# Patient Record
Sex: FEMALE | Race: WHITE | NOT HISPANIC OR LATINO | Employment: PART TIME | URBAN - NONMETROPOLITAN AREA
[De-identification: names, ages, dates, MRNs, and addresses within clinical notes are randomized per-mention and may not be internally consistent; named-entity substitution may affect disease eponyms.]

---

## 2017-01-24 ENCOUNTER — OFFICE VISIT (OUTPATIENT)
Dept: CARDIOLOGY | Facility: CLINIC | Age: 57
End: 2017-01-24

## 2017-01-24 VITALS
DIASTOLIC BLOOD PRESSURE: 83 MMHG | WEIGHT: 189.8 LBS | OXYGEN SATURATION: 98 % | HEART RATE: 86 BPM | BODY MASS INDEX: 35.83 KG/M2 | HEIGHT: 61 IN | SYSTOLIC BLOOD PRESSURE: 157 MMHG

## 2017-01-24 DIAGNOSIS — R00.2 PALPITATIONS: Primary | ICD-10-CM

## 2017-01-24 DIAGNOSIS — E78.5 HYPERLIPIDEMIA, UNSPECIFIED HYPERLIPIDEMIA TYPE: ICD-10-CM

## 2017-01-24 DIAGNOSIS — Z00.00 HEALTHCARE MAINTENANCE: ICD-10-CM

## 2017-01-24 PROCEDURE — 99213 OFFICE O/P EST LOW 20 MIN: CPT | Performed by: NURSE PRACTITIONER

## 2017-01-24 RX ORDER — LANSOPRAZOLE 30 MG/1
CAPSULE, DELAYED RELEASE ORAL DAILY
COMMUNITY
Start: 2016-12-30 | End: 2023-03-01

## 2017-01-24 RX ORDER — HYDROCODONE BITARTRATE AND ACETAMINOPHEN 5; 325 MG/1; MG/1
TABLET ORAL
Refills: 0 | COMMUNITY
Start: 2016-12-30 | End: 2021-01-19

## 2017-01-24 NOTE — PATIENT INSTRUCTIONS
Dyslipidemia  Dyslipidemia is an imbalance of the lipids in your blood. Lipids are waxy, fat-like proteins that your body needs in small amounts. Dyslipidemia often involves the lipids cholesterol or triglycerides. Common forms of dyslipidemia are:  · High levels of bad cholesterol (LDL cholesterol). LDL cholesterol is the type of cholesterol that causes heart disease.  · Low levels of good cholesterol (HDL cholesterol). HDL cholesterol is the type of cholesterol that helps protect against heart disease.  · High levels of triglycerides. Triglycerides are a fatty substance in the blood linked to a buildup of plaque on your arteries.  RISK FACTORS  · Increased age.  · Having a family history of high cholesterol.  · Certain medicines, including birth control pills, diuretics, beta-blockers, and some medicines for depression.  · Smoking.  · Eating a high-fat diet.  · Being overweight.  · Medical conditions such as diabetes, polycystic ovary syndrome, pregnancy, kidney disease, and hypothyroidism.  · Lack of regular exercise.  SIGNS AND SYMPTOMS  There are no signs or symptoms with dyslipidemia.  DIAGNOSIS  A simple blood test called a fasting blood test can be done to determine your level of:  · Total cholesterol. This is the combined number of LDL cholesterol and HDL cholesterol. A healthy number is lower than 200.  · LDL cholesterol. The goal number for LDL cholesterol is different for each person depending on risk factors. Ask your health care provider what your LDL cholesterol number should be.  · HDL cholesterol. A healthy level of HDL cholesterol is 60 or higher. A number lower than 40 for men or 50 for women is a danger sign.  · Triglycerides. A healthy triglyceride number is less than 150.  TREATMENT  Dyslipidemia is a treatable condition. Your health care provider will advise you on what type of treatment is best based on your age, your test results, and current guidelines. Treatment may include:  · Dietary  changes. A dietitian may help you create a diet that is based on your risk factors, conditions, and lifestyle.  · Regular exercise. This can help lower your LDL cholesterol, raise your HDL cholesterol, and help with weight management. Check with your health care provider before beginning an exercise program. Most people should participate in 30 minutes of brisk exercise 5 days a week.  · Quitting smoking.  · Medicines to lower LDL cholesterol and triglycerides.  · If you have high levels of triglycerides, your health care provider may:    Have you stop drinking alcohol.    Have you restrict your fat intake.    Have you eliminate refined sugars from your diet.    Treat you for other conditions, such as underactive thyroid gland (hypothyroidism) and high blood sugar (hyperglycemia).  Your health care provider will monitor your lipid levels with regular blood tests.  HOME CARE INSTRUCTIONS  · Eat a healthy diet. Follow any diet instructions if they were given to you by your health care provider.  · Maintain a healthy weight.  · Exercise regularly based on the recommendations of your health care provider.  · Do not use any tobacco products, including cigarettes, chewing tobacco, or electronic cigarettes.  · Take medicines only as directed by your health care provider.  · Keep all follow-up visits as directed by your health care provider.  SEEK MEDICAL CARE IF:  You are having possible side effects from your medicines.     This information is not intended to replace advice given to you by your health care provider. Make sure you discuss any questions you have with your health care provider.     Document Released: 12/23/2014 Document Revised: 01/08/2016 Document Reviewed: 12/23/2014  [employer] Interactive Patient Education ©2016 [employer]    Palpitations  A palpitation is the feeling that your heartbeat is irregular or is faster than normal. It may feel like your heart is fluttering or skipping a beat. Palpitations are  usually not a serious problem. However, in some cases, you may need further medical evaluation.  CAUSES   Palpitations can be caused by:  · Smoking.  · Caffeine or other stimulants, such as diet pills or energy drinks.  · Alcohol.  · Stress and anxiety.  · Strenuous physical activity.  · Fatigue.  · Certain medicines.  · Heart disease, especially if you have a history of irregular heart rhythms (arrhythmias), such as atrial fibrillation, atrial flutter, or supraventricular tachycardia.  · An improperly working pacemaker or defibrillator.  DIAGNOSIS   To find the cause of your palpitations, your health care provider will take your medical history and perform a physical exam. Your health care provider may also have you take a test called an ambulatory electrocardiogram (ECG). An ECG records your heartbeat patterns over a 24-hour period. You may also have other tests, such as:  · Transthoracic echocardiogram (TTE). During echocardiography, sound waves are used to evaluate how blood flows through your heart.  · Transesophageal echocardiogram (EUGENIE).  · Cardiac monitoring. This allows your health care provider to monitor your heart rate and rhythm in real time.  · Holter monitor. This is a portable device that records your heartbeat and can help diagnose heart arrhythmias. It allows your health care provider to track your heart activity for several days, if needed.  · Stress tests by exercise or by giving medicine that makes the heart beat faster.  TREATMENT   Treatment of palpitations depends on the cause of your symptoms and can vary greatly. Most cases of palpitations do not require any treatment other than time, relaxation, and monitoring your symptoms. Other causes, such as atrial fibrillation, atrial flutter, or supraventricular tachycardia, usually require further treatment.  HOME CARE INSTRUCTIONS   · Avoid:    Caffeinated coffee, tea, soft drinks, diet pills, and energy drinks.    Chocolate.    Alcohol.  · Stop  smoking if you smoke.  · Reduce your stress and anxiety. Things that can help you relax include:    A method of controlling things in your body, such as your heartbeats, with your mind (biofeedback).    Yoga.    Meditation.    Physical activity such as swimming, jogging, or walking.  · Get plenty of rest and sleep.  SEEK MEDICAL CARE IF:   · You continue to have a fast or irregular heartbeat beyond 24 hours.  · Your palpitations occur more often.  SEEK IMMEDIATE MEDICAL CARE IF:  · You have chest pain or shortness of breath.  · You have a severe headache.  · You feel dizzy or you faint.  MAKE SURE YOU:  · Understand these instructions.  · Will watch your condition.  · Will get help right away if you are not doing well or get worse.     This information is not intended to replace advice given to you by your health care provider. Make sure you discuss any questions you have with your health care provider.     Document Released: 12/15/2001 Document Revised: 12/23/2014 Document Reviewed: 02/15/2013  [employer] Interactive Patient Education ©2016 [employer]  Edema  Edema is an abnormal buildup of fluids in your body tissues. Edema is somewhat dependent on gravity to pull the fluid to the lowest place in your body. That makes the condition more common in the legs and thighs (lower extremities). Painless swelling of the feet and ankles is common and becomes more likely as you get older. It is also common in looser tissues, like around your eyes.   When the affected area is squeezed, the fluid may move out of that spot and leave a dent for a few moments. This dent is called pitting.   CAUSES   There are many possible causes of edema. Eating too much salt and being on your feet or sitting for a long time can cause edema in your legs and ankles. Hot weather may make edema worse. Common medical causes of edema include:  · Heart failure.  · Liver disease.  · Kidney disease.  · Weak blood vessels in your legs.  · Cancer.  · An  injury.  · Pregnancy.  · Some medications.  · Obesity.   SYMPTOMS   Edema is usually painless. Your skin may look swollen or shiny.   DIAGNOSIS   Your health care provider may be able to diagnose edema by asking about your medical history and doing a physical exam. You may need to have tests such as X-rays, an electrocardiogram, or blood tests to check for medical conditions that may cause edema.   TREATMENT   Edema treatment depends on the cause. If you have heart, liver, or kidney disease, you need the treatment appropriate for these conditions. General treatment may include:  · Elevation of the affected body part above the level of your heart.  · Compression of the affected body part. Pressure from elastic bandages or support stockings squeezes the tissues and forces fluid back into the blood vessels. This keeps fluid from entering the tissues.  · Restriction of fluid and salt intake.  · Use of a water pill (diuretic). These medications are appropriate only for some types of edema. They pull fluid out of your body and make you urinate more often. This gets rid of fluid and reduces swelling, but diuretics can have side effects. Only use diuretics as directed by your health care provider.  HOME CARE INSTRUCTIONS   · Keep the affected body part above the level of your heart when you are lying down.    · Do not sit still or stand for prolonged periods.    · Do not put anything directly under your knees when lying down.  · Do not wear constricting clothing or garters on your upper legs.    · Exercise your legs to work the fluid back into your blood vessels. This may help the swelling go down.    · Wear elastic bandages or support stockings to reduce ankle swelling as directed by your health care provider.    · Eat a low-salt diet to reduce fluid if your health care provider recommends it.    · Only take medicines as directed by your health care provider.   SEEK MEDICAL CARE IF:   · Your edema is not responding to  treatment.  · You have heart, liver, or kidney disease and notice symptoms of edema.  · You have edema in your legs that does not improve after elevating them.    · You have sudden and unexplained weight gain.  SEEK IMMEDIATE MEDICAL CARE IF:   · You develop shortness of breath or chest pain.    · You cannot breathe when you lie down.  · You develop pain, redness, or warmth in the swollen areas.    · You have heart, liver, or kidney disease and suddenly get edema.  · You have a fever and your symptoms suddenly get worse.  MAKE SURE YOU:   · Understand these instructions.  · Will watch your condition.  · Will get help right away if you are not doing well or get worse.     This information is not intended to replace advice given to you by your health care provider. Make sure you discuss any questions you have with your health care provider.     Document Released: 12/18/2006 Document Revised: 01/08/2016 Document Reviewed: 10/10/2014  [employer] Interactive Patient Education ©2016 [employer]

## 2017-01-24 NOTE — MR AVS SNAPSHOT
Xochitl Albert   1/24/2017 3:30 PM   Office Visit    Dept Phone:  [phone]   Encounter #:  [id number]    Provider:  KENNA Gaspar   Department:  [facility] CARDIOLOGY                Your Full Care Plan              Your Updated Medication List          This list is accurate as of: 1/24/17  4:40 PM.  Always use your most recent med list.                HYDROcodone-acetaminophen 5-325 MG per tablet   Commonly known as:  NORCO       lansoprazole 30 MG capsule   Commonly known as:  PREVACID               You Were Diagnosed With        Codes Comments    Palpitations    -  Primary ICD-10-CM: R00.2  ICD-9-CM: 785.1     Hyperlipidemia, unspecified hyperlipidemia type     ICD-10-CM: E78.5  ICD-9-CM: 272.4     Healthcare maintenance     ICD-10-CM: Z00.00  ICD-9-CM: V70.0       Instructions    Dyslipidemia  Dyslipidemia is an imbalance of the lipids in your blood. Lipids are waxy, fat-like proteins that your body needs in small amounts. Dyslipidemia often involves the lipids cholesterol or triglycerides. Common forms of dyslipidemia are:  · High levels of bad cholesterol (LDL cholesterol). LDL cholesterol is the type of cholesterol that causes heart disease.  · Low levels of good cholesterol (HDL cholesterol). HDL cholesterol is the type of cholesterol that helps protect against heart disease.  · High levels of triglycerides. Triglycerides are a fatty substance in the blood linked to a buildup of plaque on your arteries.  RISK FACTORS  · Increased age.  · Having a family history of high cholesterol.  · Certain medicines, including birth control pills, diuretics, beta-blockers, and some medicines for depression.  · Smoking.  · Eating a high-fat diet.  · Being overweight.  · Medical conditions such as diabetes, polycystic ovary syndrome, pregnancy, kidney disease, and hypothyroidism.  · Lack of regular exercise.  SIGNS AND SYMPTOMS  There are no signs or symptoms with  dyslipidemia.  DIAGNOSIS  A simple blood test called a fasting blood test can be done to determine your level of:  · Total cholesterol. This is the combined number of LDL cholesterol and HDL cholesterol. A healthy number is lower than 200.  · LDL cholesterol. The goal number for LDL cholesterol is different for each person depending on risk factors. Ask your health care provider what your LDL cholesterol number should be.  · HDL cholesterol. A healthy level of HDL cholesterol is 60 or higher. A number lower than 40 for men or 50 for women is a danger sign.  · Triglycerides. A healthy triglyceride number is less than 150.  TREATMENT  Dyslipidemia is a treatable condition. Your health care provider will advise you on what type of treatment is best based on your age, your test results, and current guidelines. Treatment may include:  · Dietary changes. A dietitian may help you create a diet that is based on your risk factors, conditions, and lifestyle.  · Regular exercise. This can help lower your LDL cholesterol, raise your HDL cholesterol, and help with weight management. Check with your health care provider before beginning an exercise program. Most people should participate in 30 minutes of brisk exercise 5 days a week.  · Quitting smoking.  · Medicines to lower LDL cholesterol and triglycerides.  · If you have high levels of triglycerides, your health care provider may:    Have you stop drinking alcohol.    Have you restrict your fat intake.    Have you eliminate refined sugars from your diet.    Treat you for other conditions, such as underactive thyroid gland (hypothyroidism) and high blood sugar (hyperglycemia).  Your health care provider will monitor your lipid levels with regular blood tests.  HOME CARE INSTRUCTIONS  · Eat a healthy diet. Follow any diet instructions if they were given to you by your health care provider.  · Maintain a healthy weight.  · Exercise regularly based on the recommendations of your  health care provider.  · Do not use any tobacco products, including cigarettes, chewing tobacco, or electronic cigarettes.  · Take medicines only as directed by your health care provider.  · Keep all follow-up visits as directed by your health care provider.  SEEK MEDICAL CARE IF:  You are having possible side effects from your medicines.     This information is not intended to replace advice given to you by your health care provider. Make sure you discuss any questions you have with your health care provider.     Document Released: 12/23/2014 Document Revised: 01/08/2016 Document Reviewed: 12/23/2014  [employer] Interactive Patient Education ©2016 [employer]    Palpitations  A palpitation is the feeling that your heartbeat is irregular or is faster than normal. It may feel like your heart is fluttering or skipping a beat. Palpitations are usually not a serious problem. However, in some cases, you may need further medical evaluation.  CAUSES   Palpitations can be caused by:  · Smoking.  · Caffeine or other stimulants, such as diet pills or energy drinks.  · Alcohol.  · Stress and anxiety.  · Strenuous physical activity.  · Fatigue.  · Certain medicines.  · Heart disease, especially if you have a history of irregular heart rhythms (arrhythmias), such as atrial fibrillation, atrial flutter, or supraventricular tachycardia.  · An improperly working pacemaker or defibrillator.  DIAGNOSIS   To find the cause of your palpitations, your health care provider will take your medical history and perform a physical exam. Your health care provider may also have you take a test called an ambulatory electrocardiogram (ECG). An ECG records your heartbeat patterns over a 24-hour period. You may also have other tests, such as:  · Transthoracic echocardiogram (TTE). During echocardiography, sound waves are used to evaluate how blood flows through your heart.  · Transesophageal echocardiogram (EUGENIE).  · Cardiac monitoring. This allows  your health care provider to monitor your heart rate and rhythm in real time.  · Holter monitor. This is a portable device that records your heartbeat and can help diagnose heart arrhythmias. It allows your health care provider to track your heart activity for several days, if needed.  · Stress tests by exercise or by giving medicine that makes the heart beat faster.  TREATMENT   Treatment of palpitations depends on the cause of your symptoms and can vary greatly. Most cases of palpitations do not require any treatment other than time, relaxation, and monitoring your symptoms. Other causes, such as atrial fibrillation, atrial flutter, or supraventricular tachycardia, usually require further treatment.  HOME CARE INSTRUCTIONS   · Avoid:    Caffeinated coffee, tea, soft drinks, diet pills, and energy drinks.    Chocolate.    Alcohol.  · Stop smoking if you smoke.  · Reduce your stress and anxiety. Things that can help you relax include:    A method of controlling things in your body, such as your heartbeats, with your mind (biofeedback).    Yoga.    Meditation.    Physical activity such as swimming, jogging, or walking.  · Get plenty of rest and sleep.  SEEK MEDICAL CARE IF:   · You continue to have a fast or irregular heartbeat beyond 24 hours.  · Your palpitations occur more often.  SEEK IMMEDIATE MEDICAL CARE IF:  · You have chest pain or shortness of breath.  · You have a severe headache.  · You feel dizzy or you faint.  MAKE SURE YOU:  · Understand these instructions.  · Will watch your condition.  · Will get help right away if you are not doing well or get worse.     This information is not intended to replace advice given to you by your health care provider. Make sure you discuss any questions you have with your health care provider.     Document Released: 12/15/2001 Document Revised: 12/23/2014 Document Reviewed: 02/15/2013  [employer] Interactive Patient Education ©2016 [employer] Inc.  Edema  Edema is an  abnormal buildup of fluids in your body tissues. Edema is somewhat dependent on gravity to pull the fluid to the lowest place in your body. That makes the condition more common in the legs and thighs (lower extremities). Painless swelling of the feet and ankles is common and becomes more likely as you get older. It is also common in looser tissues, like around your eyes.   When the affected area is squeezed, the fluid may move out of that spot and leave a dent for a few moments. This dent is called pitting.   CAUSES   There are many possible causes of edema. Eating too much salt and being on your feet or sitting for a long time can cause edema in your legs and ankles. Hot weather may make edema worse. Common medical causes of edema include:  · Heart failure.  · Liver disease.  · Kidney disease.  · Weak blood vessels in your legs.  · Cancer.  · An injury.  · Pregnancy.  · Some medications.  · Obesity.   SYMPTOMS   Edema is usually painless. Your skin may look swollen or shiny.   DIAGNOSIS   Your health care provider may be able to diagnose edema by asking about your medical history and doing a physical exam. You may need to have tests such as X-rays, an electrocardiogram, or blood tests to check for medical conditions that may cause edema.   TREATMENT   Edema treatment depends on the cause. If you have heart, liver, or kidney disease, you need the treatment appropriate for these conditions. General treatment may include:  · Elevation of the affected body part above the level of your heart.  · Compression of the affected body part. Pressure from elastic bandages or support stockings squeezes the tissues and forces fluid back into the blood vessels. This keeps fluid from entering the tissues.  · Restriction of fluid and salt intake.  · Use of a water pill (diuretic). These medications are appropriate only for some types of edema. They pull fluid out of your body and make you urinate more often. This gets rid of fluid  and reduces swelling, but diuretics can have side effects. Only use diuretics as directed by your health care provider.  HOME CARE INSTRUCTIONS   · Keep the affected body part above the level of your heart when you are lying down.    · Do not sit still or stand for prolonged periods.    · Do not put anything directly under your knees when lying down.  · Do not wear constricting clothing or garters on your upper legs.    · Exercise your legs to work the fluid back into your blood vessels. This may help the swelling go down.    · Wear elastic bandages or support stockings to reduce ankle swelling as directed by your health care provider.    · Eat a low-salt diet to reduce fluid if your health care provider recommends it.    · Only take medicines as directed by your health care provider.   SEEK MEDICAL CARE IF:   · Your edema is not responding to treatment.  · You have heart, liver, or kidney disease and notice symptoms of edema.  · You have edema in your legs that does not improve after elevating them.    · You have sudden and unexplained weight gain.  SEEK IMMEDIATE MEDICAL CARE IF:   · You develop shortness of breath or chest pain.    · You cannot breathe when you lie down.  · You develop pain, redness, or warmth in the swollen areas.    · You have heart, liver, or kidney disease and suddenly get edema.  · You have a fever and your symptoms suddenly get worse.  MAKE SURE YOU:   · Understand these instructions.  · Will watch your condition.  · Will get help right away if you are not doing well or get worse.     This information is not intended to replace advice given to you by your health care provider. Make sure you discuss any questions you have with your health care provider.     Document Released: 12/18/2006 Document Revised: 01/08/2016 Document Reviewed: 10/10/2014  [employer] Interactive Patient Education ©2016 [employer]       Patient Instructions History      Upcoming Appointments     Visit Type Date Time  "Department    OFFICE VISIT 2017  3:30 PM [facility]      [facility] Signup     [facility] [employer] allows you to send messages to your doctor, view your test results, renew your prescriptions, schedule appointments, and more. To sign up, go to [email or web address] and click on the Sign Up Now link in the New User? box. Enter your [employer] Activation Code exactly as it appears below along with the last four digits of your Social Security Number and your Date of Birth () to complete the sign-up process. If you do not sign up before the expiration date, you must request a new code.    [employer] Activation Code: [email or web address]  Expires: 2017  4:39 PM    If you have questions, you can email [email or web address] or call [phone] to talk to our [employer] staff. Remember, [employer] is NOT to be used for urgent needs. For medical emergencies, dial 911.               Other Info from Your Visit           Allergies     Bactrim [Sulfamethoxazole-trimethoprim]      Sulfa Antibiotics        Reason for Visit     Follow-up 6 mo.      Vital Signs     Blood Pressure Pulse Height Weight Oxygen Saturation Body Mass Index    157/83 (BP Location: Left arm, Patient Position: Sitting) 86 61\" (154.9 cm) 189 lb 12.8 oz (86.1 kg) 98% 35.86 kg/m2    Smoking Status                   Never Smoker           Problems and Diagnoses Noted     High cholesterol or triglycerides    Palpitations    Routine medical exam            "

## 2017-01-24 NOTE — PROGRESS NOTES
Subjective   Xochitl Albert is a 56 y.o. female     Chief Complaint   Patient presents with   • Follow-up     6 mo.       HPI    Problem List:    1.) Palpitations  2.) Hyperlipidemia  3.) Reported history of Leaky Heart Valve, insuff. data  3.1.) Echo 7/30/15 - EF > 65%; DD I; Trace MR, TR    Patient is a 56-year-old female who presents today for a follow-up with her son at her side.  She denies any chest pain or pressure.  She says she will have palpitations, but not very often at all.  She denies any dizziness, presyncope, syncope, orthopnea or PND.  She will get edema usually at end of the day.  She works at the [employer] and is on her feet all day.  She denies any shortness of breath with her daily routine.  PCP has not checked her cholesterol in some time.     Current Outpatient Prescriptions   Medication Sig Dispense Refill   • HYDROcodone-acetaminophen (NORCO) 5-325 MG per tablet prn  0   • lansoprazole (PREVACID) 30 MG capsule Daily.       No current facility-administered medications for this visit.        ALLERGIES    Bactrim [sulfamethoxazole-trimethoprim] and Sulfa antibiotics    Past Medical History   Diagnosis Date   • Arrhythmia    • Edema    • Fatigue    • Hiatal hernia    • Hyperlipidemia    • Leaky heart valve    • Palpitations        Social History     Social History   • Marital status:      Spouse name: N/A   • Number of children: N/A   • Years of education: N/A     Occupational History   • Not on file.     Social History Main Topics   • Smoking status: Never Smoker   • Smokeless tobacco: Not on file   • Alcohol use No   • Drug use: No   • Sexual activity: Not on file     Other Topics Concern   • Not on file     Social History Narrative       Family History   Problem Relation Age of Onset   • Hypertension Mother    • Hyperlipidemia Father        Review of Systems   Constitutional: Negative for diaphoresis and fatigue.   HENT: Positive for sneezing. Negative for rhinorrhea.    Eyes:  "Positive for visual disturbance (reading glasses; little difference, has to use her glasses a little more).   Respiratory: Negative for chest tightness and shortness of breath.    Cardiovascular: Positive for palpitations (usually if drink a lot of caffeine ) and leg swelling (usual ). Negative for chest pain.   Gastrointestinal: Negative for constipation, diarrhea, nausea and vomiting.   Endocrine: Negative.    Genitourinary: Negative for difficulty urinating.   Musculoskeletal: Positive for arthralgias, myalgias and neck pain (MRI bone spurs, arthritis and bursitis ). Negative for back pain.        Right shoulder, cyst and torn area    Skin: Negative.    Allergic/Immunologic: Positive for environmental allergies.   Neurological: Positive for light-headedness (sometimes ). Negative for dizziness and syncope.   Hematological: Negative.    Psychiatric/Behavioral: Negative.        Objective   Visit Vitals   • /83 (BP Location: Left arm, Patient Position: Sitting)   • Pulse 86   • Ht 61\" (154.9 cm)   • Wt 189 lb 12.8 oz (86.1 kg)   • SpO2 98%   • BMI 35.86 kg/m2     Lab Results (most recent)     None        Physical Exam   Constitutional: She is oriented to person, place, and time. Vital signs are normal. She appears well-developed and well-nourished. She is active and cooperative.   HENT:   Head: Normocephalic.   Mouth/Throat: Abnormal dentition (missing teeth ).   Eyes: Lids are normal.   Wears glasses PRN    Neck: Normal carotid pulses, no hepatojugular reflux and no JVD present. Carotid bruit is not present.   Cardiovascular: Normal rate, regular rhythm and normal heart sounds.    Pulses:       Radial pulses are 2+ on the right side, and 2+ on the left side.        Dorsalis pedis pulses are 2+ on the right side, and 2+ on the left side.        Posterior tibial pulses are 2+ on the right side   1+ edema LLE; trace edema RLE.    Pulmonary/Chest: Effort normal and breath sounds normal.   Abdominal: Normal " appearance.   Neurological: She is alert and oriented to person, place, and time.   Skin: Skin is warm, dry and intact.   Psychiatric: She has a normal mood and affect. Her speech is normal and behavior is normal. Judgment and thought content normal. Cognition and memory are normal.         Assessment/Plan      Diagnosis Plan   1. Palpitations     2. Hyperlipidemia, unspecified hyperlipidemia type  Lipid Panel    Comprehensive Metabolic Panel    Lipid Panel    Comprehensive Metabolic Panel   3. Healthcare maintenance  Lipid Panel    Comprehensive Metabolic Panel    Lipid Panel    Comprehensive Metabolic Panel       Return in about 6 months (around 7/24/2017).       Patient is doing very well from a cardiac standpoint.  She will continue her medication regimen.  She will get lipids and CMP.  She will follow-up in 6 mos or sooner if any changes.

## 2021-01-19 ENCOUNTER — OFFICE VISIT (OUTPATIENT)
Dept: CARDIOLOGY | Facility: CLINIC | Age: 61
End: 2021-01-19

## 2021-01-19 VITALS
DIASTOLIC BLOOD PRESSURE: 98 MMHG | BODY MASS INDEX: 34.55 KG/M2 | SYSTOLIC BLOOD PRESSURE: 166 MMHG | HEART RATE: 102 BPM | OXYGEN SATURATION: 98 % | WEIGHT: 183 LBS | HEIGHT: 61 IN

## 2021-01-19 DIAGNOSIS — R00.2 PALPITATIONS: Primary | ICD-10-CM

## 2021-01-19 DIAGNOSIS — I10 ESSENTIAL HYPERTENSION: ICD-10-CM

## 2021-01-19 DIAGNOSIS — R00.0 TACHYCARDIA: ICD-10-CM

## 2021-01-19 DIAGNOSIS — E78.2 MIXED HYPERLIPIDEMIA: ICD-10-CM

## 2021-01-19 PROCEDURE — 99204 OFFICE O/P NEW MOD 45 MIN: CPT | Performed by: NURSE PRACTITIONER

## 2021-01-19 NOTE — PATIENT INSTRUCTIONS
"Fat and Cholesterol Restricted Eating Plan  Getting too much fat and cholesterol in your diet may cause health problems. Choosing the right foods helps keep your fat and cholesterol at normal levels. This can keep you from getting certain diseases.  Your doctor may recommend an eating plan that includes:  · Total fat: ______% or less of total calories a day.  · Saturated fat: ______% or less of total calories a day.  · Cholesterol: less than _________mg a day.  · Fiber: ______g a day.  What are tips for following this plan?  Meal planning  · At meals, divide your plate into four equal parts:  ? Fill one-half of your plate with vegetables and green salads.  ? Fill one-fourth of your plate with whole grains.  ? Fill one-fourth of your plate with low-fat (lean) protein foods.  · Eat fish that is high in omega-3 fats at least two times a week. This includes mackerel, tuna, sardines, and salmon.  · Eat foods that are high in fiber, such as whole grains, beans, apples, broccoli, carrots, peas, and barley.  General tips    · Work with your doctor to lose weight if you need to.  · Avoid:  ? Foods with added sugar.  ? Fried foods.  ? Foods with partially hydrogenated oils.  · Limit alcohol intake to no more than 1 drink a day for nonpregnant women and 2 drinks a day for men. One drink equals 12 oz of beer, 5 oz of wine, or 1½ oz of hard liquor.  Reading food labels  · Check food labels for:  ? Trans fats.  ? Partially hydrogenated oils.  ? Saturated fat (g) in each serving.  ? Cholesterol (mg) in each serving.  ? Fiber (g) in each serving.  · Choose foods with healthy fats, such as:  ? Monounsaturated fats.  ? Polyunsaturated fats.  ? Omega-3 fats.  · Choose grain products that have whole grains. Look for the word \"whole\" as the first word in the ingredient list.  Cooking  · Cook foods using low-fat methods. These include baking, boiling, grilling, and broiling.  · Eat more home-cooked foods. Eat at restaurants and buffets " less often.  · Avoid cooking using saturated fats, such as butter, cream, palm oil, palm kernel oil, and coconut oil.  Recommended foods    Fruits  · All fresh, canned (in natural juice), or frozen fruits.  Vegetables  · Fresh or frozen vegetables (raw, steamed, roasted, or grilled). Green salads.  Grains  · Whole grains, such as whole wheat or whole grain breads, crackers, cereals, and pasta. Unsweetened oatmeal, bulgur, barley, quinoa, or brown rice. Corn or whole wheat flour tortillas.  Meats and other protein foods  · Ground beef (85% or leaner), grass-fed beef, or beef trimmed of fat. Skinless chicken or turkey. Ground chicken or turkey. Pork trimmed of fat. All fish and seafood. Egg whites. Dried beans, peas, or lentils. Unsalted nuts or seeds. Unsalted canned beans. Nut butters without added sugar or oil.  Dairy  · Low-fat or nonfat dairy products, such as skim or 1% milk, 2% or reduced-fat cheeses, low-fat and fat-free ricotta or cottage cheese, or plain low-fat and nonfat yogurt.  Fats and oils  · Tub margarine without trans fats. Light or reduced-fat mayonnaise and salad dressings. Avocado. Olive, canola, sesame, or safflower oils.  The items listed above may not be a complete list of foods and beverages you can eat. Contact a dietitian for more information.  Foods to avoid  Fruits  · Canned fruit in heavy syrup. Fruit in cream or butter sauce. Fried fruit.  Vegetables  · Vegetables cooked in cheese, cream, or butter sauce. Fried vegetables.  Grains  · White bread. White pasta. White rice. Cornbread. Bagels, pastries, and croissants. Crackers and snack foods that contain trans fat and hydrogenated oils.  Meats and other protein foods  · Fatty cuts of meat. Ribs, chicken wings, pavon, sausage, bologna, salami, chitterlings, fatback, hot dogs, bratwurst, and packaged lunch meats. Liver and organ meats. Whole eggs and egg yolks. Chicken and turkey with skin. Fried meat.  Dairy  · Whole or 2% milk, cream,  half-and-half, and cream cheese. Whole milk cheeses. Whole-fat or sweetened yogurt. Full-fat cheeses. Nondairy creamers and whipped toppings. Processed cheese, cheese spreads, and cheese curds.  Beverages  · Alcohol. Sugar-sweetened drinks such as sodas, lemonade, and fruit drinks.  Fats and oils  · Butter, stick margarine, lard, shortening, ghee, or pavon fat. Coconut, palm kernel, and palm oils.  Sweets and desserts  · Corn syrup, sugars, honey, and molasses. Candy. Jam and jelly. Syrup. Sweetened cereals. Cookies, pies, cakes, donuts, muffins, and ice cream.  The items listed above may not be a complete list of foods and beverages you should avoid. Contact a dietitian for more information.  Summary  · Choosing the right foods helps keep your fat and cholesterol at normal levels. This can keep you from getting certain diseases.  · At meals, fill one-half of your plate with vegetables and green salads.  · Eat high-fiber foods, like whole grains, beans, apples, carrots, peas, and barley.  · Limit added sugar, saturated fats, alcohol, and fried foods.  This information is not intended to replace advice given to you by your health care provider. Make sure you discuss any questions you have with your health care provider.  Document Revised: 08/21/2019 Document Reviewed: 09/04/2018  [employer] Patient Education © 2020 [employer]  BMI for Adults  What is BMI?  Body mass index (BMI) is a number that is calculated from a person's weight and height. BMI can help estimate how much of a person's weight is composed of fat. BMI does not measure body fat directly. Rather, it is an alternative to procedures that directly measure body fat, which can be difficult and expensive.  BMI can help identify people who may be at higher risk for certain medical problems.  What are BMI measurements used for?  BMI is used as a screening tool to identify possible weight problems. It helps determine whether a person is obese, overweight, a  "healthy weight, or underweight.  BMI is useful for:  · Identifying a weight problem that may be related to a medical condition or may increase the risk for medical problems.  · Promoting changes, such as changes in diet and exercise, to help reach a healthy weight. BMI screening can be repeated to see if these changes are working.  How is BMI calculated?  BMI involves measuring your weight in relation to your height. Both height and weight are measured, and the BMI is calculated from those numbers. This can be done either in English ([place] or metric measurements. Note that charts and online BMI calculators are available to help you find your BMI quickly and easily without having to do these calculations yourself.  To calculate your BMI in English ([place] measurements:    1. Measure your weight in pounds (lb).  2. Multiply the number of pounds by 703.  ? For example, for a person who weighs 180 lb, multiply that number by 703, which equals 126,540.  3. Measure your height in inches. Then multiply that number by itself to get a measurement called \"inches squared.\"  ? For example, for a person who is 70 inches tall, the \"inches squared\" measurement is 70 inches x 70 inches, which equals 4,900 inches squared.  4. Divide the total from step 2 (number of lb x 703) by the total from step 3 (inches squared): 126,540 ÷ 4,900 = 25.8. This is your BMI.  To calculate your BMI in metric measurements:  1. Measure your weight in kilograms (kg).  2. Measure your height in meters (m). Then multiply that number by itself to get a measurement called \"meters squared.\"  ? For example, for a person who is 1.75 m tall, the \"meters squared\" measurement is 1.75 m x 1.75 m, which is equal to 3.1 meters squared.  3. Divide the number of kilograms (your weight) by the meters squared number. In this example: 70 ÷ 3.1 = 22.6. This is your BMI.  What do the results mean?  BMI charts are used to identify whether you are underweight, normal weight, " overweight, or obese. The following guidelines will be used:  · Underweight: BMI less than 18.5.  · Normal weight: BMI between 18.5 and 24.9.  · Overweight: BMI between 25 and 29.9.  · Obese: BMI of 30 or above.  Keep these notes in mind:  · Weight includes both fat and muscle, so someone with a muscular build, such as an athlete, may have a BMI that is higher than 24.9. In cases like these, BMI is not an accurate measure of body fat.  · To determine if excess body fat is the cause of a BMI of 25 or higher, further assessments may need to be done by a health care provider.  · BMI is usually interpreted in the same way for men and women.  Where to find more information  For more information about BMI, including tools to quickly calculate your BMI, go to these websites:  · Centers for Disease Control and Prevention: www.cdc.gov  · American Heart Association: www.heart.org  · National Heart, Lung, and Blood [place]: www.nhlbi.nih.gov  Summary  · Body mass index (BMI) is a number that is calculated from a person's weight and height.  · BMI may help estimate how much of a person's weight is composed of fat. BMI can help identify those who may be at higher risk for certain medical problems.  · BMI can be measured using English measurements or metric measurements.  · BMI charts are used to identify whether you are underweight, normal weight, overweight, or obese.  This information is not intended to replace advice given to you by your health care provider. Make sure you discuss any questions you have with your health care provider.  Document Revised: 09/09/2020 Document Reviewed: 07/17/2020  [employer] Patient Education © 2020 [employer]    Hypertension, Adult  Hypertension is another name for high blood pressure. High blood pressure forces your heart to work harder to pump blood. This can cause problems over time.  There are two numbers in a blood pressure reading. There is a top number (systolic) over a bottom number  (diastolic). It is best to have a blood pressure that is below 120/80. Healthy choices can help lower your blood pressure, or you may need medicine to help lower it.  What are the causes?  The cause of this condition is not known. Some conditions may be related to high blood pressure.  What increases the risk?  · Smoking.  · Having type 2 diabetes mellitus, high cholesterol, or both.  · Not getting enough exercise or physical activity.  · Being overweight.  · Having too much fat, sugar, calories, or salt (sodium) in your diet.  · Drinking too much alcohol.  · Having long-term (chronic) kidney disease.  · Having a family history of high blood pressure.  · Age. Risk increases with age.  · Race. You may be at higher risk if you are .  · Gender. Men are at higher risk than women before age 45. After age 65, women are at higher risk than men.  · Having obstructive sleep apnea.  · Stress.  What are the signs or symptoms?  · High blood pressure may not cause symptoms. Very high blood pressure (hypertensive crisis) may cause:  ? Headache.  ? Feelings of worry or nervousness (anxiety).  ? Shortness of breath.  ? Nosebleed.  ? A feeling of being sick to your stomach (nausea).  ? Throwing up (vomiting).  ? Changes in how you see.  ? Very bad chest pain.  ? Seizures.  How is this treated?  · This condition is treated by making healthy lifestyle changes, such as:  ? Eating healthy foods.  ? Exercising more.  ? Drinking less alcohol.  · Your health care provider may prescribe medicine if lifestyle changes are not enough to get your blood pressure under control, and if:  ? Your top number is above 130.  ? Your bottom number is above 80.  · Your personal target blood pressure may vary.  Follow these instructions at home:  Eating and drinking    · If told, follow the DASH eating plan. To follow this plan:  ? Fill one half of your plate at each meal with fruits and vegetables.  ? Fill one fourth of your plate at each  meal with whole grains. Whole grains include whole-wheat pasta, brown rice, and whole-grain bread.  ? Eat or drink low-fat dairy products, such as skim milk or low-fat yogurt.  ? Fill one fourth of your plate at each meal with low-fat (lean) proteins. Low-fat proteins include fish, chicken without skin, eggs, beans, and tofu.  ? Avoid fatty meat, cured and processed meat, or chicken with skin.  ? Avoid pre-made or processed food.  · Eat less than 1,500 mg of salt each day.  · Do not drink alcohol if:  ? Your doctor tells you not to drink.  ? You are pregnant, may be pregnant, or are planning to become pregnant.  · If you drink alcohol:  ? Limit how much you use to:  § 0-1 drink a day for women.  § 0-2 drinks a day for men.  ? Be aware of how much alcohol is in your drink. In the [place] one drink equals one 12 oz bottle of beer (355 mL), one 5 oz glass of wine (148 mL), or one 1½ oz glass of hard liquor (44 mL).  Lifestyle    · Work with your doctor to stay at a healthy weight or to lose weight. Ask your doctor what the best weight is for you.  · Get at least 30 minutes of exercise most days of the week. This may include walking, swimming, or biking.  · Get at least 30 minutes of exercise that strengthens your muscles (resistance exercise) at least 3 days a week. This may include lifting weights or doing Pilates.  · Do not use any products that contain nicotine or tobacco, such as cigarettes, e-cigarettes, and chewing tobacco. If you need help quitting, ask your doctor.  · Check your blood pressure at home as told by your doctor.  · Keep all follow-up visits as told by your doctor. This is important.  Medicines  · Take over-the-counter and prescription medicines only as told by your doctor. Follow directions carefully.  · Do not skip doses of blood pressure medicine. The medicine does not work as well if you skip doses. Skipping doses also puts you at risk for problems.  · Ask your doctor about side effects or  reactions to medicines that you should watch for.  Contact a doctor if you:  · Think you are having a reaction to the medicine you are taking.  · Have headaches that keep coming back (recurring).  · Feel dizzy.  · Have swelling in your ankles.  · Have trouble with your vision.  Get help right away if you:  · Get a very bad headache.  · Start to feel mixed up (confused).  · Feel weak or numb.  · Feel faint.  · Have very bad pain in your:  ? Chest.  ? Belly (abdomen).  · Throw up more than once.  · Have trouble breathing.  Summary  · Hypertension is another name for high blood pressure.  · High blood pressure forces your heart to work harder to pump blood.  · For most people, a normal blood pressure is less than 120/80.  · Making healthy choices can help lower blood pressure. If your blood pressure does not get lower with healthy choices, you may need to take medicine.  This information is not intended to replace advice given to you by your health care provider. Make sure you discuss any questions you have with your health care provider.  Document Revised: 08/28/2019 Document Reviewed: 08/28/2019  [employer] Patient Education © 2020 [employer]      Acute Coronary Syndrome  Acute coronary syndrome (ACS) is a serious problem in which there is suddenly not enough blood and oxygen reaching the heart. ACS can result in chest pain or a heart attack.  This condition is a medical emergency. If you have any symptoms of this condition, get help right away.  What are the causes?  This condition may be caused by:  · A buildup of fat and cholesterol inside the arteries (atherosclerosis). This is the most common cause. The buildup (plaque) can cause blood vessels in the heart (coronary arteries) to become narrow or blocked, which reduces blood flow to the heart. Plaque can also break off and lead to a clot, which can block an artery and cause a heart attack or stroke.  · Sudden tightening of the muscles around the coronary  arteries (coronary spasm).  · Tearing of a coronary artery (spontaneous coronary artery dissection).  · Very low blood pressure (hypotension).  · An abnormal heartbeat (arrhythmia).  · Other medical conditions that cause a decrease of oxygen to the heart, such as anemiaorrespiratory failure.  · Using cocaine or methamphetamine.  What increases the risk?  The following factors may make you more likely to develop this condition:  · Age. The risk for ACS increases as you get older.  · History of chest pain, heart attack, peripheral artery disease, or stroke.  · Having taken chemotherapy or immune-suppressing medicines.  · Being male.  · Family history of chest pain, heart disease, or stroke.  · Smoking.  · Not exercising enough.  · Being overweight.  · High cholesterol.  · High blood pressure (hypertension).  · Diabetes.  · Excessive alcohol use.  What are the signs or symptoms?  Common symptoms of this condition include:  · Chest pain. The pain may last a long time, or it may stop and come back (recur). It may feel like:  ? Crushing or squeezing.  ? Tightness, pressure, fullness, or heaviness.  · Arm, neck, jaw, or back pain.  · Heartburn or indigestion.  · Shortness of breath.  · Nausea.  · Sudden cold sweats.  · Light-headedness.  · Dizziness or passing out.  · Tiredness (fatigue).  Sometimes there are no symptoms.  How is this diagnosed?  This condition may be diagnosed based on:  · Your medical history and symptoms.  · Imaging tests, such as:  ? An electrocardiogram (ECG). This measures the heart's electrical activity.  ? X-rays.  ? CT scan.  ? A coronary angiogram. For this test, dye is injected into the heart arteries and then X-rays are taken.  ? Myocardial perfusion imaging. This test shows how well blood flows through your heart muscle.  · Blood tests. These may be repeated at certain time intervals.  · Exercise stress testing.  · Echocardiogram. This is a test that uses sound waves to produce detailed images  of the heart.  How is this treated?  Treatment for this condition may include:  · Oxygen therapy.  · Medicines, such as:  ? Antiplatelet medicines and blood-thinning medicines, such as aspirin. These help prevent blood clots.  ? Medicine that dissolves any blood clots (fibrinolytic therapy).  ? Blood pressure medicines.  ? Nitroglycerin. This helps widen blood vessels to improve blood flow.  ? Pain medicine.  ? Cholesterol-lowering medicine.  · Surgery, such as:  ? Coronary angioplasty with stent placement. This involves placing a small piece of metal that looks like mesh or a spring into a narrow coronary artery. This widens the artery and keeps it open.  ? Coronary artery bypass surgery. This involves taking a section of a blood vessel from a different part of your body and placing it on the blocked coronary artery to allow blood to flow around the blockage.  · Cardiac rehabilitation. This is a program that includes exercise training, education, and counseling to help you recover.  Follow these instructions at home:  Eating and drinking  · Eat a heart-healthy diet that includes whole grains, fruits and vegetables, lean proteins, and low-fat or nonfat dairy products.  · Limit how much salt (sodium) you eat as told by your health care provider. Follow instructions from your health care provider about any other eating or drinking restrictions, such as limiting foods that are high in fat and processed sugars.  · Use healthy cooking methods such as roasting, grilling, broiling, baking, poaching, steaming, or stir-frying.  · Work with a dietitian to follow a heart-healthy eating plan.  Medicines  · Take over-the-counter and prescription medicines only as told by your health care provider.  · Do not take these medicines unless your health care provider approves:  ? Vitamin supplements that contain vitamin A or vitamin E.  ? NSAIDs, such as ibuprofen, naproxen, or celecoxib.  ? Hormone replacement therapy that contains  estrogen.  · If you are taking blood thinners:  ? Talk with your health care provider before you take any medicines that contain aspirin or NSAIDs. These medicines increase your risk for dangerous bleeding.  ? Take your medicine exactly as told, at the same time every day.  ? Avoid activities that could cause injury or bruising, and follow instructions about how to prevent falls.  ? Wear a medical alert bracelet, and carry a card that lists what medicines you take.  Activity  · Follow your cardiac rehabilitation program. Do exercises as told by your physical therapist.  · Ask your health care provider what activities and exercises are safe for you. Follow his or her instructions about lifting, driving, or climbing stairs.  Lifestyle  · Do not use any products that contain nicotine or tobacco, such as cigarettes, e-cigarettes, and chewing tobacco. If you need help quitting, ask your health care provider.  · Do not drink alcohol if your health care provider tells you not to drink.  · If you drink alcohol:  ? Limit how much you have to 0-1 drink a day.  ? Be aware of how much alcohol is in your drink. In the [place] one drink equals one 12 oz bottle of beer (355 mL), one 5 oz glass of wine (148 mL), or one 1½ oz glass of hard liquor (44 mL).  · Maintain a healthy weight. If you need to lose weight, work with your health care provider to do so safely.  General instructions  · Tell all the health care providers who provide care for you about your heart condition, including your dentist. This may affect the medicines or treatment you receive.  · Manage any other health conditions you have, such as hypertension or diabetes. These conditions affect your heart.  · Pay attention to your mental health. You may be at higher risk for depression.  ? Find ways to manage stress.  ? Talk to your health care provider about depression screening and treatment.  · Keep your vaccinations up to date.  ? Get the flu shot (influenza vaccine)  every year.  ? Get the pneumococcal vaccine if you are age 65 or older.  · If directed, monitor your blood pressure at home.  · Keep all follow-up visits as told by your health care provider. This is important.  Contact a health care provider if you:  · Feel overwhelmed or sad.  · Have trouble doing your daily activities.  Get help right away if you:  · Have pain in your chest, neck, arm, jaw, stomach, or back that recurs, and:  ? It lasts for more than a few minutes.  ? It is not relieved by taking the medicineyour health care provider prescribed.  · Have unexplained:  ? Heavy sweating.  ? Heartburn or indigestion.  ? Nausea or vomiting.  ? Shortness of breath.  ? Difficulty breathing.  ? Fatigue.  ? Nervousness or anxiety.  ? Weakness.  ? Diarrhea.  ? Dark stools or blood in your stool.  · Have sudden light-headedness or dizziness.  · Have blood pressure that is higher than 180/120.  · Faint.  · Have thoughts about hurting yourself.  These symptoms may represent a serious problem that is an emergency. Do not wait to see if the symptoms will go away. Get medical help right away. Call your local emergency services (911 in the [place] Do not drive yourself to the hospital.   Summary  · Acute coronary syndrome (ACS) is when there is not enough blood and oxygen being supplied to the heart. ACS can result in chest pain or a heart attack.  · Acute coronary syndrome is a medical emergency. If you have any symptoms of this condition, get help right away.  · Treatment includes medicines and procedures to open the blocked arteries and restore blood flow.  This information is not intended to replace advice given to you by your health care provider. Make sure you discuss any questions you have with your health care provider.  Document Revised: 05/20/2020 Document Reviewed: 12/30/2019  [employer] Patient Education © 2020 [employer]      Palpitations  Palpitations are feelings that your heartbeat is not normal. Your heartbeat  may feel like it is:  · Uneven.  · Faster than normal.  · Fluttering.  · Skipping a beat.  This is usually not a serious problem. In some cases, you may need tests to rule out any serious problems.  Follow these instructions at home:  Pay attention to any changes in your condition. Take these actions to help manage your symptoms:  Eating and drinking  · Avoid:  ? Coffee, tea, soft drinks, and energy drinks.  ? Chocolate.  ? Alcohol.  ? Diet pills.  Lifestyle    · Try to lower your stress. These things can help you relax:  ? Yoga.  ? Deep breathing and meditation.  ? Exercise.  ? Using words and images to create positive thoughts (guided imagery).  ? Using your mind to control things in your body (biofeedback).  · Do not use drugs.  · Get plenty of rest and sleep. Keep a regular bed time.  General instructions    · Take over-the-counter and prescription medicines only as told by your doctor.  · Do not use any products that contain nicotine or tobacco, such as cigarettes and e-cigarettes. If you need help quitting, ask your doctor.  · Keep all follow-up visits as told by your doctor. This is important. You may need more tests if palpitations do not go away or get worse.  Contact a doctor if:  · Your symptoms last more than 24 hours.  · Your symptoms occur more often.  Get help right away if you:  · Have chest pain.  · Feel short of breath.  · Have a very bad headache.  · Feel dizzy.  · Pass out (faint).  Summary  · Palpitations are feelings that your heartbeat is uneven or faster than normal. It may feel like your heart is fluttering or skipping a beat.  · Avoid food and drinks that may cause palpitations. These include caffeine, chocolate, and alcohol.  · Try to lower your stress. Do not smoke or use drugs.  · Get help right away if you faint or have chest pain, shortness of breath, a severe headache, or dizziness.  This information is not intended to replace advice given to you by your health care provider. Make  sure you discuss any questions you have with your health care provider.  Document Revised: 01/30/2019 Document Reviewed: 01/30/2019  Elsevier Patient Education © 2020 [employer] Inc[employer]

## 2021-01-19 NOTE — PROGRESS NOTES
Subjective     Xochitl Albert is a 60 y.o. female who presents to day for Hypertension (problems with htn after having Covid in November 2020) and Rapid Heart Rate (2 wks ago).    CHIEF COMPLIANT  Chief Complaint   Patient presents with   • Hypertension     problems with htn after having Covid in November 2020   • Rapid Heart Rate     2 wks ago       Active Problems:  Problem List Items Addressed This Visit        Other    Palpitations - Primary    Relevant Orders    Adult Transthoracic Echo Complete W/ Cont if Necessary Per Protocol    Hyperlipidemia      Other Visit Diagnoses     Essential hypertension        Relevant Orders    Adult Transthoracic Echo Complete W/ Cont if Necessary Per Protocol    Tachycardia          Problem list  1.  Hypertension  2.  Rapid heart rate  3.  Lightheadedness    HPI  HPI  Ms. Albert is a 60-year-old female patient who is being seen today to establish care for hypertension and rapid heart rate.  Patient does have arterial hypertension that started and November after COVID-19 infection.  She was started on blood pressure medications and had significant side effects including lisinopril in which her mouth felt funny.  She denies any associated symptoms other than rapid heart rate with her hypertension.  Patient reports that her blood pressure is usually always elevated when she is at the doctor's office.  She did provide a blood pressure log for a 5-day.  Which shows that her blood pressure is relatively well controlled.  Ranging anywhere from 130s to 140s over 80s to 90s.  Her heart rates in the 80s to 90s on average as well.  We also reviewed her laboratory work-up that was provided by her PCP.  Her labs are relatively unremarkable other than her cholesterol.  Her total cholesterol is 278, HDL 51, triglycerides 186, .  She has tried multiple statins including Crestor, Lipitor, and Zetia which she is on for 1 year.  She had significant myalgias with all of these medications and  she is completely intolerant to statins per her report.  We did discuss potential Repatha/Praluent to see if we can better control her hyperlipidemia.  She also reports ports on Sunday that she had an episode of lightheadedness but she has not had any reoccurrence of symptoms.  She does report that she does have palpitations which she can feel her heart rate flutter or skipped and race at times.  This goes from anywhere 1 to 2 weeks in between each episode.  She denies any chest pain, shortness of breath, lower extremity edema, fatigue, syncope, PND, orthopnea, or other neurological problems.  PRIOR MEDS  Current Outpatient Medications on File Prior to Visit   Medication Sig Dispense Refill   • lansoprazole (PREVACID) 30 MG capsule Daily.       No current facility-administered medications on file prior to visit.        ALLERGIES  Omnicef [cefdinir] and Sulfa antibiotics    HISTORY  Past Medical History:   Diagnosis Date   • Arrhythmia    • COVID-19 11/2020   • Edema    • Fatigue    • Hiatal hernia    • Hyperlipidemia    • Leaky heart valve    • Palpitations        Social History     Socioeconomic History   • Marital status:      Spouse name: Not on file   • Number of children: Not on file   • Years of education: Not on file   • Highest education level: Not on file   Tobacco Use   • Smoking status: Never Smoker   Substance and Sexual Activity   • Alcohol use: No   • Drug use: No   • Sexual activity: Defer       Family History   Problem Relation Age of Onset   • Hypertension Mother    • Hyperlipidemia Father        Review of Systems   Constitutional: Negative.  Negative for chills, fatigue and fever.   HENT: Negative.  Negative for congestion, sinus pressure and sore throat.    Eyes: Positive for visual disturbance (readers).   Respiratory: Negative.  Negative for chest tightness and shortness of breath.    Cardiovascular: Positive for palpitations. Negative for chest pain and leg swelling.   Gastrointestinal:  "Negative.  Negative for abdominal pain, blood in stool, constipation, diarrhea, nausea and vomiting.   Endocrine: Negative.  Negative for cold intolerance and heat intolerance.   Genitourinary: Positive for frequency. Negative for dysuria, hematuria and urgency.   Musculoskeletal: Positive for arthralgias and neck pain (spurs). Negative for back pain.   Skin: Negative.  Negative for rash and wound.   Allergic/Immunologic: Negative.  Negative for environmental allergies and food allergies.   Neurological: Positive for dizziness (Sunday, took tylenol relieved, fluid on ears). Negative for syncope and light-headedness.   Psychiatric/Behavioral: Negative.  Negative for sleep disturbance (denies waking with soa or cp).       Objective     VITALS: /98 (BP Location: Left arm, Patient Position: Sitting)   Pulse 102   Ht 154.9 cm (61\")   Wt 83 kg (183 lb)   SpO2 98%   BMI 34.58 kg/m²     LABS:   Lab Results (most recent)     None          IMAGING:   No Images in the past 120 days found..    EXAM:  Physical Exam  Vitals signs and nursing note reviewed.   Constitutional:       Appearance: Normal appearance. She is well-developed.   HENT:      Head: Normocephalic and atraumatic.   Eyes:      Pupils: Pupils are equal, round, and reactive to light.   Neck:      Musculoskeletal: Neck supple.      Thyroid: No thyroid mass.      Vascular: No carotid bruit or JVD.      Trachea: Trachea and phonation normal.   Cardiovascular:      Rate and Rhythm: Normal rate and regular rhythm.      Pulses:           Radial pulses are 2+ on the right side and 2+ on the left side.        Posterior tibial pulses are 2+ on the right side and 2+ on the left side.      Heart sounds: Normal heart sounds. No murmur. No friction rub. No gallop.    Pulmonary:      Effort: Pulmonary effort is normal. No respiratory distress.      Breath sounds: Normal breath sounds. No wheezing or rales.   Abdominal:      General: Bowel sounds are normal. There is " no distension or abdominal bruit.      Palpations: Abdomen is soft.      Tenderness: There is no abdominal tenderness.   Musculoskeletal: Normal range of motion.         General: No swelling.   Skin:     General: Skin is warm and dry.      Capillary Refill: Capillary refill takes less than 2 seconds.      Findings: No rash.   Neurological:      Mental Status: She is alert and oriented to person, place, and time.      Cranial Nerves: No cranial nerve deficit.      Sensory: No sensory deficit.   Psychiatric:         Speech: Speech normal.         Behavior: Behavior normal.         Thought Content: Thought content normal.         Judgment: Judgment normal.         Procedure   Procedures       Assessment/Plan    Diagnosis Plan   1. Palpitations  Adult Transthoracic Echo Complete W/ Cont if Necessary Per Protocol   2. Essential hypertension  Adult Transthoracic Echo Complete W/ Cont if Necessary Per Protocol   3. Tachycardia     4. Mixed hyperlipidemia     1.  Patient does have palpitations and tachycardia were her heart rate is also elevated today at 102.  She did provide a list of her blood pressure and heart rate for a 1 week.  It did seem like her blood pressure is relatively well controlled during this time with blood pressures ranging to 1 30-1 40.  We did discuss potential medication to help control her hypertension.  However she wishes to defer at this time.  I would like to get a echocardiogram to evaluate for hypertensive heart disease and also to explore for other potential causes of her palpitations and tachycardia.  2.  Patient does have hyperlipidemia in which she is tried multiple statins including Crestor Lipitor and Zetia in which she had significant myalgias with all of them.  We did discuss potential initiation of Repatha and Praluent to help better control her hyperlipidemia  3.  Informed of signs and symptoms of ACS and advised to seek emergent treatment for any new worsening symptoms.  Patient also  advised sooner follow-up as needed.  Also advised to follow-up with family doctor as needed  This note is dictated utilizing voice recognition software.  Although this record has been proof read, transcriptional errors may still be present. If questions occur regarding the content of this record please do not hesitate to call our office.  I have reviewed and confirmed the accuracy of the ROS as documented by the MA/LPN/RN KENNA Duke    Return in about 6 weeks (around 3/2/2021), or if symptoms worsen or fail to improve.    Diagnoses and all orders for this visit:    1. Palpitations (Primary)  -     Adult Transthoracic Echo Complete W/ Cont if Necessary Per Protocol; Future    2. Essential hypertension  -     Adult Transthoracic Echo Complete W/ Cont if Necessary Per Protocol; Future    3. Tachycardia    4. Mixed hyperlipidemia        Xochitl Albert  reports that she has never smoked. She does not have any smokeless tobacco history on file..      Patient's Body mass index is 34.58 kg/m². BMI is above normal parameters. Recommendations include: educational material.           MEDS ORDERED DURING VISIT:  No orders of the defined types were placed in this encounter.          This document has been electronically signed by KENNA Duke Jr.  January 24, 2021 23:50 EST

## 2021-02-10 ENCOUNTER — HOSPITAL ENCOUNTER (OUTPATIENT)
Dept: CARDIOLOGY | Facility: HOSPITAL | Age: 61
Discharge: HOME | End: 2021-02-10
Admitting: NURSE PRACTITIONER

## 2021-02-10 DIAGNOSIS — R00.2 PALPITATIONS: ICD-10-CM

## 2021-02-10 DIAGNOSIS — I10 ESSENTIAL HYPERTENSION: ICD-10-CM

## 2021-02-10 PROCEDURE — 93356 MYOCRD STRAIN IMG SPCKL TRCK: CPT

## 2021-02-10 PROCEDURE — 93306 TTE W/DOPPLER COMPLETE: CPT

## 2021-02-10 PROCEDURE — 93306 TTE W/DOPPLER COMPLETE: CPT | Performed by: INTERNAL MEDICINE

## 2021-02-10 PROCEDURE — 93356 MYOCRD STRAIN IMG SPCKL TRCK: CPT | Performed by: INTERNAL MEDICINE

## 2021-02-11 ENCOUNTER — APPOINTMENT (OUTPATIENT)
Dept: CARDIOLOGY | Facility: HOSPITAL | Age: 61
End: 2021-02-11

## 2021-02-12 ENCOUNTER — TRANSCRIBE ORDERS (OUTPATIENT)
Dept: ADMINISTRATIVE | Facility: HOSPITAL | Age: 61
End: 2021-02-12

## 2021-02-12 DIAGNOSIS — H93.13 TINNITUS, BILATERAL: Primary | ICD-10-CM

## 2021-02-21 LAB
BH CV ECHO MEAS - ACS: 2 CM
BH CV ECHO MEAS - AO MAX PG: 8.9 MMHG
BH CV ECHO MEAS - AO MEAN PG: 5 MMHG
BH CV ECHO MEAS - AO ROOT AREA (BSA CORRECTED): 1.6
BH CV ECHO MEAS - AO ROOT AREA: 6.8 CM^2
BH CV ECHO MEAS - AO ROOT DIAM: 3 CM
BH CV ECHO MEAS - AO V2 MAX: 149 CM/SEC
BH CV ECHO MEAS - AO V2 MEAN: 99 CM/SEC
BH CV ECHO MEAS - AO V2 VTI: 28.2 CM
BH CV ECHO MEAS - BSA(HAYCOCK): 1.9 M^2
BH CV ECHO MEAS - BSA: 1.8 M^2
BH CV ECHO MEAS - BZI_BMI: 34.6 KILOGRAMS/M^2
BH CV ECHO MEAS - BZI_METRIC_HEIGHT: 154.9 CM
BH CV ECHO MEAS - BZI_METRIC_WEIGHT: 83 KG
BH CV ECHO MEAS - EDV(CUBED): 35.9 ML
BH CV ECHO MEAS - EDV(MOD-SP4): 59.8 ML
BH CV ECHO MEAS - EDV(TEICH): 44.1 ML
BH CV ECHO MEAS - EF(CUBED): 87 %
BH CV ECHO MEAS - EF(MOD-SP4): 63.4 %
BH CV ECHO MEAS - EF(TEICH): 81.8 %
BH CV ECHO MEAS - ESV(CUBED): 4.7 ML
BH CV ECHO MEAS - ESV(MOD-SP4): 21.9 ML
BH CV ECHO MEAS - ESV(TEICH): 8 ML
BH CV ECHO MEAS - FS: 49.4 %
BH CV ECHO MEAS - IVS/LVPW: 0.86
BH CV ECHO MEAS - IVSD: 0.91 CM
BH CV ECHO MEAS - LA DIMENSION: 3.5 CM
BH CV ECHO MEAS - LA/AO: 1.2
BH CV ECHO MEAS - LV DIASTOLIC VOL/BSA (35-75): 32.9 ML/M^2
BH CV ECHO MEAS - LV IVRT: 0.07 SEC
BH CV ECHO MEAS - LV MASS(C)D: 92.8 GRAMS
BH CV ECHO MEAS - LV MASS(C)DI: 51 GRAMS/M^2
BH CV ECHO MEAS - LV SYSTOLIC VOL/BSA (12-30): 12 ML/M^2
BH CV ECHO MEAS - LVIDD: 3.3 CM
BH CV ECHO MEAS - LVIDS: 1.7 CM
BH CV ECHO MEAS - LVLD AP4: 7.1 CM
BH CV ECHO MEAS - LVLS AP4: 5.7 CM
BH CV ECHO MEAS - LVOT AREA (M): 2.8 CM^2
BH CV ECHO MEAS - LVOT AREA: 2.8 CM^2
BH CV ECHO MEAS - LVOT DIAM: 1.9 CM
BH CV ECHO MEAS - LVPWD: 1.1 CM
BH CV ECHO MEAS - MV A MAX VEL: 69.8 CM/SEC
BH CV ECHO MEAS - MV DEC SLOPE: 451 CM/SEC^2
BH CV ECHO MEAS - MV E MAX VEL: 75.4 CM/SEC
BH CV ECHO MEAS - MV E/A: 1.1
BH CV ECHO MEAS - RVDD: 2.4 CM
BH CV ECHO MEAS - SI(AO): 106 ML/M^2
BH CV ECHO MEAS - SI(CUBED): 17.2 ML/M^2
BH CV ECHO MEAS - SI(MOD-SP4): 20.8 ML/M^2
BH CV ECHO MEAS - SI(TEICH): 19.9 ML/M^2
BH CV ECHO MEAS - SV(AO): 192.7 ML
BH CV ECHO MEAS - SV(CUBED): 31.3 ML
BH CV ECHO MEAS - SV(MOD-SP4): 37.9 ML
BH CV ECHO MEAS - SV(TEICH): 36.1 ML
MAXIMAL PREDICTED HEART RATE: 160 BPM
STRESS TARGET HR: 136 BPM

## 2021-02-22 ENCOUNTER — TELEPHONE (OUTPATIENT)
Dept: CARDIOLOGY | Facility: CLINIC | Age: 61
End: 2021-02-22

## 2021-02-22 NOTE — TELEPHONE ENCOUNTER
ECHO RESULTS BRIEFLY DISCUSSED WITH MRS. OSBORN. AWARE TO KEEP F/U APPT. RADHA HUYNH             ----- Message from Nicol Gonzalez LPN sent at 2/22/2021  2:41 PM EST -----  Regarding: FW:    ----- Message -----  From: Boaz Moore APRN  Sent: 2/21/2021   8:00 PM EST  To: Nicol Gonzalez LPN  Subject: FW:                                              No acute findings on echo keep follow up  ----- Message -----  From: Jeremiah Granado MD  Sent: 2/21/2021   7:48 PM EST  To: KENNA Duke

## 2021-02-23 ENCOUNTER — HOSPITAL ENCOUNTER (OUTPATIENT)
Dept: CT IMAGING | Facility: HOSPITAL | Age: 61
Discharge: HOME | End: 2021-02-23
Admitting: OTOLARYNGOLOGY

## 2021-02-23 DIAGNOSIS — H93.13 TINNITUS, BILATERAL: ICD-10-CM

## 2021-02-23 PROCEDURE — 70480 CT ORBIT/EAR/FOSSA W/O DYE: CPT

## 2021-03-12 ENCOUNTER — OFFICE VISIT (OUTPATIENT)
Dept: CARDIOLOGY | Facility: CLINIC | Age: 61
End: 2021-03-12

## 2021-03-12 VITALS
HEART RATE: 80 BPM | DIASTOLIC BLOOD PRESSURE: 85 MMHG | OXYGEN SATURATION: 98 % | WEIGHT: 185 LBS | SYSTOLIC BLOOD PRESSURE: 150 MMHG | BODY MASS INDEX: 34.93 KG/M2 | TEMPERATURE: 97.8 F | HEIGHT: 61 IN

## 2021-03-12 DIAGNOSIS — I10 ESSENTIAL HYPERTENSION: ICD-10-CM

## 2021-03-12 DIAGNOSIS — R00.2 PALPITATIONS: ICD-10-CM

## 2021-03-12 DIAGNOSIS — E78.2 MIXED HYPERLIPIDEMIA: Primary | ICD-10-CM

## 2021-03-12 PROBLEM — E78.5 HYPERLIPIDEMIA: Status: ACTIVE | Noted: 2021-03-12

## 2021-03-12 PROBLEM — I38 HEART VALVE REGURGITATION: Status: ACTIVE | Noted: 2021-03-12

## 2021-03-12 PROBLEM — R53.83 FATIGUE: Status: ACTIVE | Noted: 2021-03-12

## 2021-03-12 PROBLEM — R60.9 EDEMA: Status: ACTIVE | Noted: 2021-03-12

## 2021-03-12 PROCEDURE — 99214 OFFICE O/P EST MOD 30 MIN: CPT | Performed by: NURSE PRACTITIONER

## 2021-03-12 RX ORDER — MELATONIN
1000 DAILY
COMMUNITY

## 2021-03-12 RX ORDER — LISINOPRIL 2.5 MG/1
TABLET ORAL
COMMUNITY
Start: 2020-12-08 | End: 2021-03-12

## 2021-03-12 RX ORDER — CLONIDINE HYDROCHLORIDE 0.1 MG/1
TABLET ORAL
COMMUNITY
Start: 2020-12-03 | End: 2021-03-12

## 2021-03-12 NOTE — PATIENT INSTRUCTIONS
"Fat and Cholesterol Restricted Eating Plan  Getting too much fat and cholesterol in your diet may cause health problems. Choosing the right foods helps keep your fat and cholesterol at normal levels. This can keep you from getting certain diseases.  Your doctor may recommend an eating plan that includes:  · Total fat: ______% or less of total calories a day.  · Saturated fat: ______% or less of total calories a day.  · Cholesterol: less than _________mg a day.  · Fiber: ______g a day.  What are tips for following this plan?  Meal planning  · At meals, divide your plate into four equal parts:  ? Fill one-half of your plate with vegetables and green salads.  ? Fill one-fourth of your plate with whole grains.  ? Fill one-fourth of your plate with low-fat (lean) protein foods.  · Eat fish that is high in omega-3 fats at least two times a week. This includes mackerel, tuna, sardines, and salmon.  · Eat foods that are high in fiber, such as whole grains, beans, apples, broccoli, carrots, peas, and barley.  General tips    · Work with your doctor to lose weight if you need to.  · Avoid:  ? Foods with added sugar.  ? Fried foods.  ? Foods with partially hydrogenated oils.  · Limit alcohol intake to no more than 1 drink a day for nonpregnant women and 2 drinks a day for men. One drink equals 12 oz of beer, 5 oz of wine, or 1½ oz of hard liquor.  Reading food labels  · Check food labels for:  ? Trans fats.  ? Partially hydrogenated oils.  ? Saturated fat (g) in each serving.  ? Cholesterol (mg) in each serving.  ? Fiber (g) in each serving.  · Choose foods with healthy fats, such as:  ? Monounsaturated fats.  ? Polyunsaturated fats.  ? Omega-3 fats.  · Choose grain products that have whole grains. Look for the word \"whole\" as the first word in the ingredient list.  Cooking  · Cook foods using low-fat methods. These include baking, boiling, grilling, and broiling.  · Eat more home-cooked foods. Eat at restaurants and buffets " less often.  · Avoid cooking using saturated fats, such as butter, cream, palm oil, palm kernel oil, and coconut oil.  Recommended foods    Fruits  · All fresh, canned (in natural juice), or frozen fruits.  Vegetables  · Fresh or frozen vegetables (raw, steamed, roasted, or grilled). Green salads.  Grains  · Whole grains, such as whole wheat or whole grain breads, crackers, cereals, and pasta. Unsweetened oatmeal, bulgur, barley, quinoa, or brown rice. Corn or whole wheat flour tortillas.  Meats and other protein foods  · Ground beef (85% or leaner), grass-fed beef, or beef trimmed of fat. Skinless chicken or turkey. Ground chicken or turkey. Pork trimmed of fat. All fish and seafood. Egg whites. Dried beans, peas, or lentils. Unsalted nuts or seeds. Unsalted canned beans. Nut butters without added sugar or oil.  Dairy  · Low-fat or nonfat dairy products, such as skim or 1% milk, 2% or reduced-fat cheeses, low-fat and fat-free ricotta or cottage cheese, or plain low-fat and nonfat yogurt.  Fats and oils  · Tub margarine without trans fats. Light or reduced-fat mayonnaise and salad dressings. Avocado. Olive, canola, sesame, or safflower oils.  The items listed above may not be a complete list of foods and beverages you can eat. Contact a dietitian for more information.  Foods to avoid  Fruits  · Canned fruit in heavy syrup. Fruit in cream or butter sauce. Fried fruit.  Vegetables  · Vegetables cooked in cheese, cream, or butter sauce. Fried vegetables.  Grains  · White bread. White pasta. White rice. Cornbread. Bagels, pastries, and croissants. Crackers and snack foods that contain trans fat and hydrogenated oils.  Meats and other protein foods  · Fatty cuts of meat. Ribs, chicken wings, pavon, sausage, bologna, salami, chitterlings, fatback, hot dogs, bratwurst, and packaged lunch meats. Liver and organ meats. Whole eggs and egg yolks. Chicken and turkey with skin. Fried meat.  Dairy  · Whole or 2% milk, cream,  half-and-half, and cream cheese. Whole milk cheeses. Whole-fat or sweetened yogurt. Full-fat cheeses. Nondairy creamers and whipped toppings. Processed cheese, cheese spreads, and cheese curds.  Beverages  · Alcohol. Sugar-sweetened drinks such as sodas, lemonade, and fruit drinks.  Fats and oils  · Butter, stick margarine, lard, shortening, ghee, or pavon fat. Coconut, palm kernel, and palm oils.  Sweets and desserts  · Corn syrup, sugars, honey, and molasses. Candy. Jam and jelly. Syrup. Sweetened cereals. Cookies, pies, cakes, donuts, muffins, and ice cream.  The items listed above may not be a complete list of foods and beverages you should avoid. Contact a dietitian for more information.  Summary  · Choosing the right foods helps keep your fat and cholesterol at normal levels. This can keep you from getting certain diseases.  · At meals, fill one-half of your plate with vegetables and green salads.  · Eat high-fiber foods, like whole grains, beans, apples, carrots, peas, and barley.  · Limit added sugar, saturated fats, alcohol, and fried foods.  This information is not intended to replace advice given to you by your health care provider. Make sure you discuss any questions you have with your health care provider.  Document Revised: 08/21/2019 Document Reviewed: 09/04/2018  [employer] Patient Education © 2020 [employer]  BMI for Adults  What is BMI?  Body mass index (BMI) is a number that is calculated from a person's weight and height. BMI can help estimate how much of a person's weight is composed of fat. BMI does not measure body fat directly. Rather, it is an alternative to procedures that directly measure body fat, which can be difficult and expensive.  BMI can help identify people who may be at higher risk for certain medical problems.  What are BMI measurements used for?  BMI is used as a screening tool to identify possible weight problems. It helps determine whether a person is obese, overweight, a  "healthy weight, or underweight.  BMI is useful for:  · Identifying a weight problem that may be related to a medical condition or may increase the risk for medical problems.  · Promoting changes, such as changes in diet and exercise, to help reach a healthy weight. BMI screening can be repeated to see if these changes are working.  How is BMI calculated?  BMI involves measuring your weight in relation to your height. Both height and weight are measured, and the BMI is calculated from those numbers. This can be done either in English ([place] or metric measurements. Note that charts and online BMI calculators are available to help you find your BMI quickly and easily without having to do these calculations yourself.  To calculate your BMI in English ([place] measurements:    1. Measure your weight in pounds (lb).  2. Multiply the number of pounds by 703.  ? For example, for a person who weighs 180 lb, multiply that number by 703, which equals 126,540.  3. Measure your height in inches. Then multiply that number by itself to get a measurement called \"inches squared.\"  ? For example, for a person who is 70 inches tall, the \"inches squared\" measurement is 70 inches x 70 inches, which equals 4,900 inches squared.  4. Divide the total from step 2 (number of lb x 703) by the total from step 3 (inches squared): 126,540 ÷ 4,900 = 25.8. This is your BMI.  To calculate your BMI in metric measurements:  1. Measure your weight in kilograms (kg).  2. Measure your height in meters (m). Then multiply that number by itself to get a measurement called \"meters squared.\"  ? For example, for a person who is 1.75 m tall, the \"meters squared\" measurement is 1.75 m x 1.75 m, which is equal to 3.1 meters squared.  3. Divide the number of kilograms (your weight) by the meters squared number. In this example: 70 ÷ 3.1 = 22.6. This is your BMI.  What do the results mean?  BMI charts are used to identify whether you are underweight, normal weight, " overweight, or obese. The following guidelines will be used:  · Underweight: BMI less than 18.5.  · Normal weight: BMI between 18.5 and 24.9.  · Overweight: BMI between 25 and 29.9.  · Obese: BMI of 30 or above.  Keep these notes in mind:  · Weight includes both fat and muscle, so someone with a muscular build, such as an athlete, may have a BMI that is higher than 24.9. In cases like these, BMI is not an accurate measure of body fat.  · To determine if excess body fat is the cause of a BMI of 25 or higher, further assessments may need to be done by a health care provider.  · BMI is usually interpreted in the same way for men and women.  Where to find more information  For more information about BMI, including tools to quickly calculate your BMI, go to these websites:  · Centers for Disease Control and Prevention: www.cdc.gov  · American Heart Association: www.heart.org  · National Heart, Lung, and Blood [place]: www.nhlbi.nih.gov  Summary  · Body mass index (BMI) is a number that is calculated from a person's weight and height.  · BMI may help estimate how much of a person's weight is composed of fat. BMI can help identify those who may be at higher risk for certain medical problems.  · BMI can be measured using English measurements or metric measurements.  · BMI charts are used to identify whether you are underweight, normal weight, overweight, or obese.  This information is not intended to replace advice given to you by your health care provider. Make sure you discuss any questions you have with your health care provider.  Document Revised: 09/09/2020 Document Reviewed: 07/17/2020  [employer] Patient Education © 2020 [employer]

## 2021-03-12 NOTE — PROGRESS NOTES
Subjective     Xochitl Albert is a 60 y.o. female who presents to day for Hypertension (echo 02/10/21).    CHIEF COMPLIANT  Chief Complaint   Patient presents with   • Hypertension     echo 02/10/21       Active Problems:  Problem List Items Addressed This Visit        Cardiac and Vasculature    Hyperlipidemia - Primary    Relevant Medications    pitavastatin calcium (Livalo) 2 MG tablet tablet    Palpitations      Other Visit Diagnoses     Essential hypertension          Problem list  1.  Hypertension  1.1 echocardiogram 2/21: EF equal or greater than 50%, excluded hemodynamically severe regurgitation or stenosis  2.  Rapid heart rate  3.  Lightheadedness    HPI  HPI  Ms. Albert is a 60-year-old female patient who is being followed up today for chronic arterial hypertension.  She did provide a blood pressure log that did identify that her blood pressure seems to be well controlled on her current blood pressure medication regimen of lisinopril 2.5 mg and clonidine 0.1 mg as needed.  She says that her blood pressure still goes up and down but has been relatively within goal and supported that by her log.  She denies any associated symptoms with her chronic arterial hypertension.  She also reports chronic palpitations which she describes as a fluttering which occurs at random and on a rare basis.  She denies any associated symptoms with her palpitations.  Patient does have hyperlipidemia in which she has tried multiple statins in the past including Lipitor Crestor and Zetia without success.  We did review the labs from her PCP that was done in December 2020.  Her LDL was 186, triglycerides 199, and her HDL was 51.  She had a relatively normal CMP.  Overall she says she is doing good from the cardiovascular standpoint.  She denies any angina or anginal equivalent symptoms.  She denies chest pain, shortness of breath, lower extremity edema, fatigue, dizziness, lightheadedness, syncope, PND, orthopnea, or other  neurological problems.  PRIOR MEDS  Current Outpatient Medications on File Prior to Visit   Medication Sig Dispense Refill   • cholecalciferol (VITAMIN D3) 25 MCG (1000 UT) tablet Take 1,000 Units by mouth Daily.     • lansoprazole (PREVACID) 30 MG capsule Daily.       No current facility-administered medications on file prior to visit.       ALLERGIES  Omnicef [cefdinir] and Sulfa antibiotics    HISTORY  Past Medical History:   Diagnosis Date   • Arrhythmia    • COVID-19 11/2020   • Edema    • Fatigue    • Hiatal hernia    • Hyperlipidemia    • Leaky heart valve    • Palpitations        Social History     Socioeconomic History   • Marital status:      Spouse name: Not on file   • Number of children: Not on file   • Years of education: Not on file   • Highest education level: Not on file   Tobacco Use   • Smoking status: Never Smoker   Substance and Sexual Activity   • Alcohol use: No   • Drug use: No   • Sexual activity: Defer       Family History   Problem Relation Age of Onset   • Hypertension Mother    • Hyperlipidemia Father        Review of Systems   Constitutional: Negative.  Negative for chills, fatigue and fever.   HENT: Negative.  Negative for congestion, sinus pressure and sore throat.    Eyes: Negative.  Negative for visual disturbance.   Respiratory: Negative.  Negative for chest tightness and shortness of breath.    Cardiovascular: Positive for palpitations (some fluttering, not often). Negative for chest pain and leg swelling.   Gastrointestinal: Negative.  Negative for abdominal pain, blood in stool, constipation, diarrhea, nausea and vomiting.   Endocrine: Negative.  Negative for cold intolerance and heat intolerance.   Genitourinary: Negative.  Negative for dysuria, frequency, hematuria and urgency.   Musculoskeletal: Positive for arthralgias. Negative for back pain and neck pain.   Skin: Negative.  Negative for rash.   Allergic/Immunologic: Negative.  Negative for environmental allergies  "and food allergies.   Neurological: Positive for light-headedness (thinks r/t ears). Negative for dizziness and syncope.   Hematological: Negative.  Does not bruise/bleed easily.   Psychiatric/Behavioral: Negative.  Negative for sleep disturbance (denies waking with soa, palps or cp, denies sleeping elevated).       Objective     VITALS: /85 (BP Location: Left arm, Patient Position: Sitting)   Pulse 80   Temp 97.8 °F (36.6 °C)   Ht 154.9 cm (61\")   Wt 83.9 kg (185 lb)   SpO2 98%   BMI 34.96 kg/m²     LABS:   Lab Results (most recent)     None          IMAGING:   CT Temporal Bones Without Contrast    Result Date: 2/23/2021  Unremarkable CT examination of the temporal bones.     This study was performed with techniques to keep radiation doses as low as reasonably achievable (ALARA). Individualized dose reduction techniques using automated exposure control or adjustment of mA and/or kV according to the patient size were employed.     This report was finalized on 2/23/2021 12:25 PM by Loulou Abdi M.D..      EXAM:  Physical Exam  Vitals and nursing note reviewed.   Constitutional:       Appearance: She is well-developed.   HENT:      Head: Normocephalic and atraumatic.   Eyes:      Pupils: Pupils are equal, round, and reactive to light.   Neck:      Thyroid: No thyroid mass.      Vascular: No carotid bruit or JVD.      Trachea: Trachea and phonation normal.   Cardiovascular:      Rate and Rhythm: Normal rate and regular rhythm.      Pulses:           Radial pulses are 2+ on the right side and 2+ on the left side.        Posterior tibial pulses are 2+ on the right side and 2+ on the left side.      Heart sounds: Normal heart sounds. No murmur. No friction rub. No gallop.    Pulmonary:      Effort: Pulmonary effort is normal. No respiratory distress.      Breath sounds: Normal breath sounds. No wheezing or rales.   Abdominal:      General: Bowel sounds are normal. There is no distension or abdominal " bruit.      Palpations: Abdomen is soft.      Tenderness: There is no abdominal tenderness.   Musculoskeletal:         General: No swelling. Normal range of motion.      Cervical back: Neck supple.   Skin:     General: Skin is warm and dry.      Capillary Refill: Capillary refill takes less than 2 seconds.      Findings: No rash.   Neurological:      Mental Status: She is alert and oriented to person, place, and time.      Cranial Nerves: No cranial nerve deficit.      Sensory: No sensory deficit.   Psychiatric:         Speech: Speech normal.         Behavior: Behavior normal.         Thought Content: Thought content normal.         Judgment: Judgment normal.         Procedure   Procedures       Assessment/Plan    Diagnosis Plan   1. Mixed hyperlipidemia  pitavastatin calcium (Livalo) 2 MG tablet tablet   2. Palpitations     3. Essential hypertension     1.  Patient does have hyperlipidemia with an LDL of 186 triglycerides 199 she has tried multiple statins in the past including Lipitor and Crestor and Zetia as well.  Due to this I would like to try patient on Livalo to see if she is able to tolerate.  2.  Patient does have chronic rare palpitations which do not interfere with her activities of daily living.  They are better than what they were in the past.  We will continue to monitor at this time with no further work-up needed.  3.  Patient's blood pressure is elevated today at 150/85 however according to her blood pressure log which she is able to provide her blood pressure is usually within range.  No further medication titration is needed at this time.  She will continue to monitor blood pressure and report any significant highs or lows.  4.  Informed of signs and symptoms of ACS and advised to seek emergent treatment for any new worsening symptoms.  Patient also advised sooner follow-up as needed.  Also advised to follow-up with family doctor as needed  This note is dictated utilizing voice recognition  software.  Although this record has been proof read, transcriptional errors may still be present. If questions occur regarding the content of this record please do not hesitate to call our office.  I have reviewed and confirmed the accuracy of the ROS as documented by the MA/LPN/RN KENNA Duke    Return in about 6 months (around 9/12/2021), or if symptoms worsen or fail to improve.    Diagnoses and all orders for this visit:    1. Mixed hyperlipidemia (Primary)  -     pitavastatin calcium (Livalo) 2 MG tablet tablet; Take 1 tablet by mouth Every Night.  Dispense: 30 tablet; Refill: 11    2. Palpitations    3. Essential hypertension        Xochitl Albert  reports that she has never smoked. She does not have any smokeless tobacco history on file..         Patient's Body mass index is 34.96 kg/m². BMI is above normal parameters. Recommendations include: educational material.           MEDS ORDERED DURING VISIT:  New Medications Ordered This Visit   Medications   • pitavastatin calcium (Livalo) 2 MG tablet tablet     Sig: Take 1 tablet by mouth Every Night.     Dispense:  30 tablet     Refill:  11           This document has been electronically signed by KENNA Duke Jr.  March 15, 2021 10:28 EDT

## 2023-01-17 ENCOUNTER — OFFICE VISIT (OUTPATIENT)
Dept: UROLOGY | Facility: CLINIC | Age: 63
End: 2023-01-17
Payer: COMMERCIAL

## 2023-01-17 VITALS
HEIGHT: 61 IN | BODY MASS INDEX: 35.3 KG/M2 | SYSTOLIC BLOOD PRESSURE: 141 MMHG | WEIGHT: 187 LBS | HEART RATE: 123 BPM | DIASTOLIC BLOOD PRESSURE: 101 MMHG

## 2023-01-17 DIAGNOSIS — N30.01 ACUTE CYSTITIS WITH HEMATURIA: ICD-10-CM

## 2023-01-17 DIAGNOSIS — B37.31 YEAST VAGINITIS: ICD-10-CM

## 2023-01-17 DIAGNOSIS — N39.0 RECURRENT UTI: ICD-10-CM

## 2023-01-17 DIAGNOSIS — R35.0 FREQUENCY OF URINATION: Primary | ICD-10-CM

## 2023-01-17 LAB
BILIRUB BLD-MCNC: NEGATIVE MG/DL
CLARITY, POC: ABNORMAL
COLOR UR: YELLOW
EXPIRATION DATE: ABNORMAL
GLUCOSE UR STRIP-MCNC: NEGATIVE MG/DL
KETONES UR QL: NEGATIVE
LEUKOCYTE EST, POC: NEGATIVE
Lab: ABNORMAL
NITRITE UR-MCNC: NEGATIVE MG/ML
PH UR: 6 [PH] (ref 5–8)
PROT UR STRIP-MCNC: NEGATIVE MG/DL
RBC # UR STRIP: NEGATIVE /UL
SP GR UR: 1.01 (ref 1–1.03)
UROBILINOGEN UR QL: NORMAL

## 2023-01-17 PROCEDURE — 87086 URINE CULTURE/COLONY COUNT: CPT | Performed by: NURSE PRACTITIONER

## 2023-01-17 PROCEDURE — 87077 CULTURE AEROBIC IDENTIFY: CPT | Performed by: NURSE PRACTITIONER

## 2023-01-17 PROCEDURE — 87186 SC STD MICRODIL/AGAR DIL: CPT | Performed by: NURSE PRACTITIONER

## 2023-01-17 PROCEDURE — 99214 OFFICE O/P EST MOD 30 MIN: CPT | Performed by: NURSE PRACTITIONER

## 2023-01-17 PROCEDURE — 51798 US URINE CAPACITY MEASURE: CPT | Performed by: NURSE PRACTITIONER

## 2023-01-17 PROCEDURE — 81003 URINALYSIS AUTO W/O SCOPE: CPT | Performed by: NURSE PRACTITIONER

## 2023-01-17 RX ORDER — LISINOPRIL 2.5 MG/1
TABLET ORAL
COMMUNITY
End: 2023-03-01

## 2023-01-17 RX ORDER — ALUMINUM ZIRCONIUM OCTACHLOROHYDREX GLY 16 G/100G
1 GEL TOPICAL DAILY
Qty: 90 CAPSULE | Refills: 3 | Status: SHIPPED | OUTPATIENT
Start: 2023-01-17 | End: 2023-03-01

## 2023-01-17 RX ORDER — ESTRADIOL 0.1 MG/G
CREAM VAGINAL
COMMUNITY
Start: 2022-09-09 | End: 2023-03-01

## 2023-01-17 RX ORDER — FLUCONAZOLE 100 MG/1
TABLET ORAL
COMMUNITY
End: 2023-03-01

## 2023-01-17 RX ORDER — FLUCONAZOLE 100 MG/1
100 TABLET ORAL DAILY
Qty: 3 TABLET | Refills: 0 | Status: SHIPPED | OUTPATIENT
Start: 2023-01-17 | End: 2023-01-20

## 2023-01-17 NOTE — PROGRESS NOTES
Chief Complaint  RECURRENT BLADDER INFECTIONS/YEAST VAGINITIS/ACUTE CYSTITIS (NEW PT WITH RECURRENT YEAST INFECTIONS/-POSITIVE KOH/UTIS)    Subjective          Xochitl Albert presents to [facility] for RECURRENT UTI/YEAST VAGINITIS  History of Present Illness    Ms. Albert is a pleasant 62-year-old female who presents to clinic today for evaluation as a new patient consult, The patient is accompanied today by an adult male-HER .  She has been referred to us by her PCP, Dr. Gary Rios MD with concerns of RECURRENT bladder infections. Patient reports recurring UTIs that have been ongoing, gradually becoming very bothersome to her. As a result, she has had multiple antibiotics by mouth.    SHE IS UNSURE OF ANY POSITIVE BACTERIA GROWTH,  She also has last urine culture was positive for KOH fungi. She reports issues with yeast vaginitis that has also been persistent.  She has had multiple doses of fluconazole.  On initial evaluation in clinic today, she is post-antibiotic therapy with Keflex stopped 2 days ago. Her urine dipstick is negative for any leukocyte esterase. It is negative for nitrites, negative for gross but showed trace microscopic hematuria. PVR 0 mL       The patient reports that she has been experiencing a recurrent yeast infections. She reports that when she visits the doctor, she will have a bladder infection. RECENTLY, SHE WAS PRESCRIBED  Diflucan 100 mg 2 times a week for 1 month-POSITIVE KOH-ON 01/04/23. She states she is unable to take a higher dose of Diflucan due to burning/swelling side effect sensations to her face.     The patient reports when she has a UTI, she experiences symptoms of burning, frequency, and urgency. She states she will get up 1 to 2 times a night to urinate.  She states the only time she leaks urine is when she sneezes or water running. The patient endorses that she has aching pain in her lower back. She states she  "has mild pelvic pain. She denies any hematuria.  She has chronic back pain, but denies flank pain, abdominal pain, she does not have any CVA tenderness.  She denies having a hysterectomy.,   She states she has had these infections for a long time, but it is getting worse. She states she cannot use the antifungal cream because it requires insertion with a syringe which is very painful, irritating and causes her to bleed..      The patient recalls having her last OBGYN visit was in 2022. She states her Pap smears are always normal. She states she had a rough Pap smear and it was painful. She states when she pulled it out, there was blood on the end of it. She states she went to her family doctor and she was told that her urethra was irritated. She states she uses Estrace cream twice a week. She states she feels dry in her vagina. She notes that it is painful during intercourse. The patient endorses that she is taking lisinopril 2.5 mg, vitamin D, and vitamin B12.     THE REST OF HER PMHX AS NOTED IN CHART.    Objective   Vital Signs:   BP (!) 141/101 (BP Location: Left arm, Patient Position: Sitting, Cuff Size: Adult)   Pulse (!) 123   Ht 154.9 cm (61\")   Wt 84.8 kg (187 lb)   BMI 35.33 kg/m²       ROS:   Review of Systems   Constitutional: Negative for activity change, appetite change, diaphoresis, fatigue, fever, unexpected weight gain and unexpected weight loss.   HENT: Negative for congestion, ear discharge, ear pain, nosebleeds, rhinorrhea, sinus pressure and sore throat.    Eyes: Negative for blurred vision, double vision, photophobia, pain, redness and visual disturbance.   Respiratory: Negative for apnea, cough, chest tightness, shortness of breath, wheezing and stridor.    Cardiovascular: Negative for chest pain and palpitations.   Gastrointestinal: Positive for indigestion. Negative for abdominal distention, abdominal pain, constipation, diarrhea, nausea and vomiting.   Endocrine: Negative for " polydipsia, polyphagia and polyuria.   Genitourinary: Positive for dysuria, flank pain, frequency and urgency. Negative for decreased urine volume, difficulty urinating, hematuria, urinary incontinence, vaginal discharge and vaginal pain.   Musculoskeletal: Positive for back pain. Negative for arthralgias and joint swelling.   Skin: Negative for pallor, rash and wound.   Neurological: Positive for headache. Negative for dizziness, tremors, syncope, weakness, light-headedness, memory problem and confusion.   Hematological: Bruises/bleeds easily.   Psychiatric/Behavioral: Negative for behavioral problems. The patient is not nervous/anxious.         Physical Exam  Constitutional:       General: She is in acute distress.      Appearance: She is well-developed. She is obese. She is ill-appearing.   HENT:      Head: Normocephalic and atraumatic.   Eyes:      Pupils: Pupils are equal, round, and reactive to light.   Neck:      Thyroid: No thyromegaly.      Trachea: No tracheal deviation.   Cardiovascular:      Rate and Rhythm: Normal rate and regular rhythm.      Heart sounds: No murmur heard.  Pulmonary:      Effort: Pulmonary effort is normal. No respiratory distress.      Breath sounds: Normal breath sounds. No stridor. No wheezing.   Abdominal:      General: Bowel sounds are normal. There is distension.      Palpations: Abdomen is soft.      Tenderness: There is abdominal tenderness.   Genitourinary:     Labia:         Right: No tenderness.         Left: No tenderness.       Vagina: Normal. No vaginal discharge.      Comments: DYSURIA/YEAST VAGINITIS Soft nontender abdomen with no organomegaly, rigidity, guarding or tenderness.  Normal BUT ATROPHIC Vaginal orifice.  She has MILD leakage with Valsalva.  No significant perineal body abnormalities and a normal external anus.       Musculoskeletal:         General: No deformity. Normal range of motion.      Cervical back: Normal range of motion.   Skin:     General: Skin  is warm and dry.      Capillary Refill: Capillary refill takes less than 2 seconds.      Coloration: Skin is pale.      Findings: No erythema or rash.   Neurological:      Mental Status: She is alert and oriented to person, place, and time.      Cranial Nerves: No cranial nerve deficit.      Sensory: No sensory deficit.      Coordination: Coordination normal.   Psychiatric:         Behavior: Behavior normal.         Thought Content: Thought content normal.         Judgment: Judgment normal.        Result Review :     UA    Urinalysis 1/17/23   Ketones, UA Negative   Leukocytes, UA Negative                    Assessment and Plan    Problem List Items Addressed This Visit    None  Visit Diagnoses     Frequency of urination    -  Primary    Relevant Medications    Probiotic Product (Align) capsule    fluconazole (Diflucan) 100 MG tablet    Other Relevant Orders    POC Urinalysis Dipstick, Automated (Completed)    Urine Culture - Urine, Urine, Random Void    Acute cystitis with hematuria        Relevant Medications    estradiol (ESTRACE) 0.1 MG/GM vaginal cream    Probiotic Product (Align) capsule    fluconazole (Diflucan) 100 MG tablet    Yeast vaginitis        Relevant Medications    fluconazole (DIFLUCAN) 100 MG tablet    Probiotic Product (Align) capsule    fluconazole (Diflucan) 100 MG tablet    Recurrent UTI                                                              ASSESSMENT  Recurrent urinary tract infections/OverActiveBladder/Atrophic/yeast vaginitis:   Ms. Albert is a pleasant 62-year-old female who presents to clinic today for evaluation with concerns of RECURRENT bladder infections. Patient reports recurring UTIs that have been ongoing, gradually becoming very bothersome to her. As a result, she has had multiple antibiotics by mouth, WITH subsequent recurrent yeast infections, she reports dyspareunia, she reports atrophic vaginitis.  Her urine dipstick today is unremarkable.      We discussed the types of  organisms that are found in the urinary tract indicating that the vast majority are results of the patient's own gastrointestinal roma.  We discussed how many of the antibiotics that are utilized can actually exacerbate these infections by creating resistant organisms and there is only a very few antibiotics that are concentrated in the urine and do not affect the rectal reservoir nor cause recurrent yeast vaginitis.      We discussed the risk factors for recurrent infections being intercourse in younger patients and atrophic changes in older patients.  We discussed the symptoms that are found including pain, pressure, burning, frequency, urgency suprapubic pain and painful intercourse.  I discussed upper tract symptoms including fevers, chills, and indicated the workup would be much more aggressive if the patient were to present with recurrent infections in the face of upper tract symptomatology such as fever. I discussed the history of vesicoureteral reflux in young patients and finally chronic renal scarring as a result of such.     Vaginal Pain/Dysuria/dyspareunia: Patient reports vaginal pain and burning with urination that has been ongoing . She reports recurrent bladder infections with NO documented infection/urine culture results, she reports severe irritation and painful intercourse. WE Discussed post menopausal Vaginal atrophy (atrophic vaginitis) with patient as the  thinning, drying and inflammation of the vaginal walls that may occur when womens body has less estrogen.Discussed the facts that Vaginal atrophy occurs most often after menopause and may also lead to distressing urinary symptoms such as the burning and pain she is experiencing.      PLAN  We resent her urine for culture. I will call her with results if any bacteria growth    We discussed only treating infections if they are positive.  In that case will use a mild antibiotic such as Macrobid for suppressive therapy if need be.      In the  meantime, she has been encouraged to  increase her p.o. fluid intake to at least 1 to 2 L daily and avoid bladder irritants such as caffeine products, spicy foods, and citrusy foods.     I recommend concomitant probiotics with treatment with antibiotics to protect the rectal reservoir including over-the-counter yogurt preparations to alena oral pills containing the appropriate probiotics.    She is to also continue other medications for yeast vaginitis, atrophic vaginitis as prescribed above.    CONTINUE Vaginal Estrace Cream as directed(place on  tip of finger and apply to vagina nightly x 2 weeks, then 2-3 times weekly).    Continue Diflucan 100 mg twice weekly x4 weeks per PCP recommendation    We discussed lower tract investigation, patient has been scheduled for cystoscopy with Dr. Centeno on 03/27/23    Will see her back in 1 months for Follow up in clinic and recheck her urinalysis at that time.    Patient encouraged to return sooner if symptoms worsen    Patient is agreeable to plan of care.    Patient reports that she is not currently experiencing any symptoms of urinary incontinence.    RADIOLOGY (CT AND/OR KUB):    CT Abdomen and Pelvis: No results found for this or any previous visit.     CT Stone Protocol: No results found for this or any previous visit.     KUB: No results found for this or any previous visit.       LABS (3 MONTHS):    Office Visit on 01/17/2023   Component Date Value Ref Range Status   • Color 01/17/2023 Yellow  Yellow, Straw, Dark Yellow, Maria Eugenia Final   • Clarity, UA 01/17/2023 Cloudy (A)  Clear Final   • Specific Gravity  01/17/2023 1.015  1.005 - 1.030 Final   • pH, Urine 01/17/2023 6.0  5.0 - 8.0 Final   • Leukocytes 01/17/2023 Negative  Negative Final   • Nitrite, UA 01/17/2023 Negative  Negative Final   • Protein, POC 01/17/2023 Negative  Negative mg/dL Final   • Glucose, UA 01/17/2023 Negative  Negative mg/dL Final   • Ketones, UA 01/17/2023 Negative  Negative Final   •  Urobilinogen, UA 01/17/2023 Normal  Normal, 0.2 E.U./dL Final   • Bilirubin 01/17/2023 Negative  Negative Final   • Blood, UA 01/17/2023 Negative  Negative Final   • Lot Number 01/17/2023 n   Final   • Expiration Date 01/17/2023 n   Final        Smoking Cessation Counseling:  Never a smoker.  Patient does not currently use any tobacco products.     Follow Up   Return in about 1 month (around 2/17/2023) for Next scheduled follow up, RECURRENT UTI/DYSURIA/DETRUSSOR INSTABILITY.    Patient was given instructions and counseling regarding her condition or for health maintenance advice. Please see specific information pulled into the AVS if appropriate.          This document has been electronically signed by Griselda Cheng-Akwa, APRN   January 17, 2023 22:38 EST    Transcribed from ambient dictation for Griselda Cheng-Akwa, APRN by Los Kincaid.  01/17/23   15:54 EST    Patient or patient representative verbalized consent to the visit recording.  I have personally performed the services described in this document as transcribed by the above individual, and it is both accurate and complete.  Griselda Cheng-Akwa, APRN  1/17/2023  22:38 EST        Dictated Utilizing Dragon Dictation: Part of this note may be an electronic transcription/translation of spoken language to printed text using the Dragon Dictation System.

## 2023-01-17 NOTE — LETTER
January 17, 2023     MD [address]  [address]  [facility] [address]    Patient: Xochitl Albert   YOB: 1960   Date of Visit: 1/17/2023       Dear Blanca Vega MD,    Thank you for referring Xochitl Albert to me for evaluation. Below is a copy of my consult note.    If you have questions, please do not hesitate to call me. I look forward to following Xochitl along with you.         Sincerely,        Griselda Cheng-Akwa, APRN        CC: No Recipients    Chief Complaint  RECURRENT BLADDER INFECTIONS/YEAST VAGINITIS/ACUTE CYSTITIS (NEW PT WITH RECURRENT YEAST INFECTIONS/-POSITIVE KOH/UTIS)    Subjective           Xochitl Albert presents to [facility] for RECURRENT UTI/YEAST VAGINITIS  History of Present Illness    Ms. Albert is a pleasant 62-year-old female who presents to clinic today for evaluation as a new patient consult, The patient is accompanied today by an adult male-HER .  She has been referred to us by her PCP, Dr. Gary Rios MD with concerns of RECURRENT bladder infections. Patient reports recurring UTIs that have been ongoing, gradually becoming very bothersome to her. As a result, she has had multiple antibiotics by mouth.    SHE IS UNSURE OF ANY POSITIVE BACTERIA GROWTH,  She also has last urine culture was positive for KOH fungi. She reports issues with yeast vaginitis that has also been persistent.  She has had multiple doses of fluconazole.  On initial evaluation in clinic today, she is post-antibiotic therapy with Keflex stopped 2 days ago. Her urine dipstick is negative for any leukocyte esterase. It is negative for nitrites, negative for gross but showed trace microscopic hematuria. PVR 0 mL       The patient reports that she has been experiencing a recurrent yeast infections. She reports that when she visits the doctor, she will have a bladder infection. RECENTLY, SHE WAS PRESCRIBED  Diflucan 100  "mg 2 times a week for 1 month-POSITIVE KOH-ON 01/04/23. She states she is unable to take a higher dose of Diflucan due to burning/swelling side effect sensations to her face.     The patient reports when she has a UTI, she experiences symptoms of burning, frequency, and urgency. She states she will get up 1 to 2 times a night to urinate.  She states the only time she leaks urine is when she sneezes or water running. The patient endorses that she has aching pain in her lower back. She states she has mild pelvic pain. She denies any hematuria.  She has chronic back pain, but denies flank pain, abdominal pain, she does not have any CVA tenderness.  She denies having a hysterectomy.,   She states she has had these infections for a long time, but it is getting worse. She states she cannot use the antifungal cream because it requires insertion with a syringe which is very painful, irritating and causes her to bleed..      The patient recalls having her last OBGYN visit was in 2022. She states her Pap smears are always normal. She states she had a rough Pap smear and it was painful. She states when she pulled it out, there was blood on the end of it. She states she went to her family doctor and she was told that her urethra was irritated. She states she uses Estrace cream twice a week. She states she feels dry in her vagina. She notes that it is painful during intercourse. The patient endorses that she is taking lisinopril 2.5 mg, vitamin D, and vitamin B12.     THE REST OF HER PMHX AS NOTED IN CHART.    Objective    Vital Signs:   BP (!) 141/101 (BP Location: Left arm, Patient Position: Sitting, Cuff Size: Adult)   Pulse (!) 123   Ht 154.9 cm (61\")   Wt 84.8 kg (187 lb)   BMI 35.33 kg/m²       ROS:   Review of Systems   Constitutional: Negative for activity change, appetite change, diaphoresis, fatigue, fever, unexpected weight gain and unexpected weight loss.   HENT: Negative for congestion, ear discharge, ear pain, " nosebleeds, rhinorrhea, sinus pressure and sore throat.    Eyes: Negative for blurred vision, double vision, photophobia, pain, redness and visual disturbance.   Respiratory: Negative for apnea, cough, chest tightness, shortness of breath, wheezing and stridor.    Cardiovascular: Negative for chest pain and palpitations.   Gastrointestinal: Positive for indigestion. Negative for abdominal distention, abdominal pain, constipation, diarrhea, nausea and vomiting.   Endocrine: Negative for polydipsia, polyphagia and polyuria.   Genitourinary: Positive for dysuria, flank pain, frequency and urgency. Negative for decreased urine volume, difficulty urinating, hematuria, urinary incontinence, vaginal discharge and vaginal pain.   Musculoskeletal: Positive for back pain. Negative for arthralgias and joint swelling.   Skin: Negative for pallor, rash and wound.   Neurological: Positive for headache. Negative for dizziness, tremors, syncope, weakness, light-headedness, memory problem and confusion.   Hematological: Bruises/bleeds easily.   Psychiatric/Behavioral: Negative for behavioral problems. The patient is not nervous/anxious.         Physical Exam  Constitutional:       General: She is in acute distress.      Appearance: She is well-developed. She is obese. She is ill-appearing.   HENT:      Head: Normocephalic and atraumatic.   Eyes:      Pupils: Pupils are equal, round, and reactive to light.   Neck:      Thyroid: No thyromegaly.      Trachea: No tracheal deviation.   Cardiovascular:      Rate and Rhythm: Normal rate and regular rhythm.      Heart sounds: No murmur heard.  Pulmonary:      Effort: Pulmonary effort is normal. No respiratory distress.      Breath sounds: Normal breath sounds. No stridor. No wheezing.   Abdominal:      General: Bowel sounds are normal. There is distension.      Palpations: Abdomen is soft.      Tenderness: There is abdominal tenderness.   Genitourinary:     Labia:         Right: No  tenderness.         Left: No tenderness.       Vagina: Normal. No vaginal discharge.      Comments: DYSURIA/YEAST VAGINITIS Soft nontender abdomen with no organomegaly, rigidity, guarding or tenderness.  Normal BUT ATROPHIC Vaginal orifice.  She has MILD leakage with Valsalva.  No significant perineal body abnormalities and a normal external anus.       Musculoskeletal:         General: No deformity. Normal range of motion.      Cervical back: Normal range of motion.   Skin:     General: Skin is warm and dry.      Capillary Refill: Capillary refill takes less than 2 seconds.      Coloration: Skin is pale.      Findings: No erythema or rash.   Neurological:      Mental Status: She is alert and oriented to person, place, and time.      Cranial Nerves: No cranial nerve deficit.      Sensory: No sensory deficit.      Coordination: Coordination normal.   Psychiatric:         Behavior: Behavior normal.         Thought Content: Thought content normal.         Judgment: Judgment normal.        Result Review :     UA    Urinalysis 1/17/23   Ketones, UA Negative   Leukocytes, UA Negative                    Assessment and Plan    Problem List Items Addressed This Visit    None  Visit Diagnoses     Frequency of urination    -  Primary    Relevant Medications    Probiotic Product (Align) capsule    fluconazole (Diflucan) 100 MG tablet    Other Relevant Orders    POC Urinalysis Dipstick, Automated (Completed)    Urine Culture - Urine, Urine, Random Void    Acute cystitis with hematuria        Relevant Medications    estradiol (ESTRACE) 0.1 MG/GM vaginal cream    Probiotic Product (Align) capsule    fluconazole (Diflucan) 100 MG tablet    Yeast vaginitis        Relevant Medications    fluconazole (DIFLUCAN) 100 MG tablet    Probiotic Product (Align) capsule    fluconazole (Diflucan) 100 MG tablet    Recurrent UTI                                                              ASSESSMENT  Recurrent urinary tract  infections/OverActiveBladder/Atrophic/yeast vaginitis:   Ms. Albert is a pleasant 62-year-old female who presents to clinic today for evaluation with concerns of RECURRENT bladder infections. Patient reports recurring UTIs that have been ongoing, gradually becoming very bothersome to her. As a result, she has had multiple antibiotics by mouth, WITH subsequent recurrent yeast infections, she reports dyspareunia, she reports atrophic vaginitis.  Her urine dipstick today is unremarkable.      We discussed the types of organisms that are found in the urinary tract indicating that the vast majority are results of the patient's own gastrointestinal roma.  We discussed how many of the antibiotics that are utilized can actually exacerbate these infections by creating resistant organisms and there is only a very few antibiotics that are concentrated in the urine and do not affect the rectal reservoir nor cause recurrent yeast vaginitis.      We discussed the risk factors for recurrent infections being intercourse in younger patients and atrophic changes in older patients.  We discussed the symptoms that are found including pain, pressure, burning, frequency, urgency suprapubic pain and painful intercourse.  I discussed upper tract symptoms including fevers, chills, and indicated the workup would be much more aggressive if the patient were to present with recurrent infections in the face of upper tract symptomatology such as fever. I discussed the history of vesicoureteral reflux in young patients and finally chronic renal scarring as a result of such.     Vaginal Pain/Dysuria/dyspareunia: Patient reports vaginal pain and burning with urination that has been ongoing . She reports recurrent bladder infections with NO documented infection/urine culture results, she reports severe irritation and painful intercourse. WE Discussed post menopausal Vaginal atrophy (atrophic vaginitis) with patient as the  thinning, drying and  inflammation of the vaginal walls that may occur when womens body has less estrogen.Discussed the facts that Vaginal atrophy occurs most often after menopause and may also lead to distressing urinary symptoms such as the burning and pain she is experiencing.      PLAN  We resent her urine for culture. I will call her with results if any bacteria growth    We discussed only treating infections if they are positive.  In that case will use a mild antibiotic such as Macrobid for suppressive therapy if need be.      In the meantime, she has been encouraged to  increase her p.o. fluid intake to at least 1 to 2 L daily and avoid bladder irritants such as caffeine products, spicy foods, and citrusy foods.     I recommend concomitant probiotics with treatment with antibiotics to protect the rectal reservoir including over-the-counter yogurt preparations to alena oral pills containing the appropriate probiotics.    She is to also continue other medications for yeast vaginitis, atrophic vaginitis as prescribed above.    CONTINUE Vaginal Estrace Cream as directed(place on  tip of finger and apply to vagina nightly x 2 weeks, then 2-3 times weekly).    Continue Diflucan 100 mg twice weekly x4 weeks per PCP recommendation    We discussed lower tract investigation, patient has been scheduled for cystoscopy with Dr. Centeno on 03/27/23    Will see her back in 1 months for Follow up in clinic and recheck her urinalysis at that time.    Patient encouraged to return sooner if symptoms worsen    Patient is agreeable to plan of care.    Patient reports that she is not currently experiencing any symptoms of urinary incontinence.    RADIOLOGY (CT AND/OR KUB):    CT Abdomen and Pelvis: No results found for this or any previous visit.     CT Stone Protocol: No results found for this or any previous visit.     KUB: No results found for this or any previous visit.       LABS (3 MONTHS):    Office Visit on 01/17/2023   Component Date Value Ref  Range Status   • Color 01/17/2023 Yellow  Yellow, Straw, Dark Yellow, Maria Eugenia Final   • Clarity, UA 01/17/2023 Cloudy (A)  Clear Final   • Specific Gravity  01/17/2023 1.015  1.005 - 1.030 Final   • pH, Urine 01/17/2023 6.0  5.0 - 8.0 Final   • Leukocytes 01/17/2023 Negative  Negative Final   • Nitrite, UA 01/17/2023 Negative  Negative Final   • Protein, POC 01/17/2023 Negative  Negative mg/dL Final   • Glucose, UA 01/17/2023 Negative  Negative mg/dL Final   • Ketones, UA 01/17/2023 Negative  Negative Final   • Urobilinogen, UA 01/17/2023 Normal  Normal, 0.2 E.U./dL Final   • Bilirubin 01/17/2023 Negative  Negative Final   • Blood, UA 01/17/2023 Negative  Negative Final   • Lot Number 01/17/2023 n   Final   • Expiration Date 01/17/2023 n   Final        Smoking Cessation Counseling:  Never a smoker.  Patient does not currently use any tobacco products.     Follow Up   Return in about 1 month (around 2/17/2023) for Next scheduled follow up, RECURRENT UTI/DYSURIA/DETRUSSOR INSTABILITY.    Patient was given instructions and counseling regarding her condition or for health maintenance advice. Please see specific information pulled into the AVS if appropriate.          This document has been electronically signed by Griselda Cheng-Akwa, APRN   January 17, 2023 22:38 EST    Transcribed from ambient dictation for Griselda Cheng-Akwa, APRN by Los Kincaid.  01/17/23   15:54 EST    Patient or patient representative verbalized consent to the visit recording.  I have personally performed the services described in this document as transcribed by the above individual, and it is both accurate and complete.  Griselda Cheng-Akwa, APRN  1/17/2023  22:38 EST        Dictated Utilizing Dragon Dictation: Part of this note may be an electronic transcription/translation of spoken language to printed text using the Dragon Dictation System.

## 2023-01-19 ENCOUNTER — TELEPHONE (OUTPATIENT)
Dept: UROLOGY | Facility: CLINIC | Age: 63
End: 2023-01-19
Payer: COMMERCIAL

## 2023-01-19 LAB — BACTERIA SPEC AEROBE CULT: ABNORMAL

## 2023-01-19 NOTE — TELEPHONE ENCOUNTER
Please call patient to check on her post clinic visit, and to discuss urine culture results.  She is asymptomatic at this time and denies any urinary symptoms of dysuria frequency or urgency.  Due to a history of recurrent yeast infections, with a positive KOH, she is currently on Diflucan therapy by her PCP.  I recommend we defer any antibiotics at this time.  We will repeat another urine culture in 2 weeks.  Patient agreeable plan of care     Result Notes  Urine Culture 25,000 CFU/mL Enterococcus faecalis Abnormal           Colonization of the urinary tract without infection is common. Treatment is discouraged unless the patient is symptomatic, pregnant, or undergoing an invasive urologic procedure.        Resulting Agency: [facility]     Susceptibility     Enterococcus faecalis     DAMEON     Ampicillin <=2 ug/ml Susceptible     Levofloxacin 1 ug/ml Susceptible     Nitrofurantoin <=16 ug/ml Susceptible     Tetracycline >=16 ug/ml Resistant     Vancomycin 1 ug/ml Susceptible

## 2023-02-01 ENCOUNTER — LAB (OUTPATIENT)
Dept: UROLOGY | Facility: CLINIC | Age: 63
End: 2023-02-01
Payer: COMMERCIAL

## 2023-02-01 DIAGNOSIS — N39.0 RECURRENT UTI: Primary | ICD-10-CM

## 2023-02-01 PROCEDURE — 87086 URINE CULTURE/COLONY COUNT: CPT | Performed by: NURSE PRACTITIONER

## 2023-02-02 ENCOUNTER — TELEPHONE (OUTPATIENT)
Dept: UROLOGY | Facility: CLINIC | Age: 63
End: 2023-02-02
Payer: COMMERCIAL

## 2023-02-02 LAB — BACTERIA SPEC AEROBE CULT: NORMAL

## 2023-02-17 ENCOUNTER — OFFICE VISIT (OUTPATIENT)
Dept: UROLOGY | Facility: CLINIC | Age: 63
End: 2023-02-17
Payer: COMMERCIAL

## 2023-02-17 VITALS — HEIGHT: 61 IN | WEIGHT: 187 LBS | BODY MASS INDEX: 35.3 KG/M2

## 2023-02-17 DIAGNOSIS — N94.10 FEMALE DYSPAREUNIA: ICD-10-CM

## 2023-02-17 DIAGNOSIS — R30.0 DYSURIA: ICD-10-CM

## 2023-02-17 DIAGNOSIS — N39.0 RECURRENT UTI: Primary | ICD-10-CM

## 2023-02-17 DIAGNOSIS — N30.01 ACUTE CYSTITIS WITH HEMATURIA: ICD-10-CM

## 2023-02-17 DIAGNOSIS — N95.2 ATROPHIC VAGINITIS: ICD-10-CM

## 2023-02-17 DIAGNOSIS — R35.0 FREQUENCY OF URINATION: ICD-10-CM

## 2023-02-17 DIAGNOSIS — B37.31 YEAST VAGINITIS: ICD-10-CM

## 2023-02-17 LAB
BILIRUB BLD-MCNC: NEGATIVE MG/DL
CLARITY, POC: CLEAR
COLOR UR: YELLOW
EXPIRATION DATE: NORMAL
GLUCOSE UR STRIP-MCNC: NEGATIVE MG/DL
KETONES UR QL: NEGATIVE
LEUKOCYTE EST, POC: NEGATIVE
Lab: NORMAL
NITRITE UR-MCNC: NEGATIVE MG/ML
PH UR: 6 [PH] (ref 5–8)
PROT UR STRIP-MCNC: NEGATIVE MG/DL
RBC # UR STRIP: NEGATIVE /UL
SP GR UR: 1.03 (ref 1–1.03)
UROBILINOGEN UR QL: NORMAL

## 2023-02-17 PROCEDURE — 51798 US URINE CAPACITY MEASURE: CPT | Performed by: NURSE PRACTITIONER

## 2023-02-17 PROCEDURE — 81003 URINALYSIS AUTO W/O SCOPE: CPT | Performed by: NURSE PRACTITIONER

## 2023-02-17 PROCEDURE — 99214 OFFICE O/P EST MOD 30 MIN: CPT | Performed by: NURSE PRACTITIONER

## 2023-02-17 RX ORDER — CETIRIZINE HYDROCHLORIDE 10 MG/1
10 TABLET ORAL DAILY
COMMUNITY
Start: 2022-09-28

## 2023-02-17 RX ORDER — MONTELUKAST SODIUM 10 MG/1
10 TABLET ORAL DAILY
COMMUNITY
Start: 2022-09-28

## 2023-02-17 RX ORDER — LANSOPRAZOLE 30 MG/1
CAPSULE, DELAYED RELEASE ORAL DAILY
COMMUNITY
Start: 2022-08-26

## 2023-02-17 RX ORDER — FLUCONAZOLE 100 MG/1
100 TABLET ORAL 2 TIMES WEEKLY
Qty: 10 TABLET | Refills: 1 | Status: SHIPPED | OUTPATIENT
Start: 2023-02-20 | End: 2023-03-01

## 2023-02-17 RX ORDER — ESTRADIOL 0.1 MG/G
CREAM VAGINAL
Qty: 42.5 G | Refills: 12 | Status: SHIPPED | OUTPATIENT
Start: 2023-02-17 | End: 2023-03-01

## 2023-02-17 RX ORDER — PHENAZOPYRIDINE HYDROCHLORIDE 200 MG/1
200 TABLET, FILM COATED ORAL 3 TIMES DAILY PRN
Qty: 20 TABLET | Refills: 0 | Status: SHIPPED | OUTPATIENT
Start: 2023-02-17 | End: 2023-03-01

## 2023-02-17 RX ORDER — FLUCONAZOLE 100 MG/1
100 TABLET ORAL 2 TIMES WEEKLY
COMMUNITY
Start: 2023-01-04 | End: 2023-02-17 | Stop reason: SDUPTHER

## 2023-02-17 NOTE — PROGRESS NOTES
Chief Complaint  Frequency of urination (1 month follow up acute cystitis/yeast vaginitis/dyspareunia/)    Rodolfo Albert presents to [facility] for RECURRENT UTI/URINE FREQUENCY  History of Present Illness  Mrs. SEBAS ALBERT is a pleasant 62-year-old female who returns to clinic today for evaluation. This is a 1 month follow-up with prior concerns of recurrent UTIs. When initially evaluated in clinic last month, patient was accompanied by her . She reported recurrent UTIs that have been ongoing and gradually becoming very bothersome to her. She was post multiple antibiotic therapy for her infections, which her last urine culture did still grow E. coli. Prior to that, she was unsure of any positive bacterial growth.     She also had significant KOH +fungi and had been treated for recurrent yeast with Diflucan by her PCP. She has had multiple doses of fluconazole and has been on fluconazole therapy for over a month. With her recurrent UTIs, patient reported symptoms of burning with urination, frequency, and urgency. She had some nocturia 1 to 3 times. She states she only leaks urine when she sneezes or water running, mostly stress incontinence. She has had a negative Pap smears per se.     Today, we did reevaluate her symptoms post antifungal therapy. Her urine dipstick on initial evaluation is completely negative for any infection. It is negative for gross/microscopic hematuria. Her PVR is 0.    The patient states she is doing better. She states it is painful during sexual intercourse. She has tried Estrace cream, but is unsure if it helped.  She reported extensive burning with intercourse.  She has completed her Diflucan therapy. She confirms the use of Premarin cream twice weekly.    The patient states her bowel movements have improved.    The patient is allergic to BACTRIM, OMNICEF, and SULFA.    Rest of her PMHx as noted in  "chart  Objective   Vital Signs:   Ht 154.9 cm (60.98\")   Wt 84.8 kg (187 lb)   BMI 35.35 kg/m²       ROS:   Review of Systems   Constitutional: Negative for activity change, appetite change, diaphoresis, fatigue, fever, unexpected weight gain and unexpected weight loss.   HENT: Negative for congestion, ear discharge, ear pain, nosebleeds, rhinorrhea, sinus pressure and sore throat.    Eyes: Negative for blurred vision, double vision, photophobia, pain, redness and visual disturbance.   Respiratory: Negative for apnea, cough, chest tightness, shortness of breath, wheezing and stridor.    Cardiovascular: Negative for chest pain and palpitations.   Gastrointestinal: Positive for indigestion. Negative for abdominal distention, abdominal pain, constipation, diarrhea, nausea and vomiting.   Endocrine: Negative for polydipsia, polyphagia and polyuria.   Genitourinary: Positive for dysuria. Negative for decreased urine volume, difficulty urinating, flank pain, frequency, hematuria, urgency, urinary incontinence, vaginal discharge and vaginal pain.   Musculoskeletal: Positive for back pain. Negative for arthralgias and joint swelling.   Skin: Negative for pallor, rash and wound.   Neurological: Positive for headache. Negative for dizziness, tremors, syncope, weakness, light-headedness, memory problem and confusion.   Hematological: Bruises/bleeds easily.   Psychiatric/Behavioral: Negative for behavioral problems. The patient is not nervous/anxious.         Physical Exam  Constitutional:       General: She is in acute distress.      Appearance: She is well-developed. She is obese.   HENT:      Head: Normocephalic and atraumatic.   Eyes:      Pupils: Pupils are equal, round, and reactive to light.   Neck:      Thyroid: No thyromegaly.      Trachea: No tracheal deviation.   Cardiovascular:      Rate and Rhythm: Normal rate and regular rhythm.      Heart sounds: No murmur heard.  Pulmonary:      Effort: Pulmonary effort is " normal. No respiratory distress.      Breath sounds: Normal breath sounds. No stridor. No wheezing.   Abdominal:      General: Bowel sounds are normal. There is distension.      Palpations: Abdomen is soft.      Tenderness: There is abdominal tenderness.   Genitourinary:     Labia:         Right: No tenderness.         Left: No tenderness.       Vagina: Normal. No vaginal discharge.   Musculoskeletal:         General: No deformity. Normal range of motion.      Cervical back: Normal range of motion.   Skin:     General: Skin is warm and dry.      Capillary Refill: Capillary refill takes less than 2 seconds.      Coloration: Skin is pale.      Findings: No erythema or rash.   Neurological:      Mental Status: She is alert and oriented to person, place, and time.      Cranial Nerves: No cranial nerve deficit.      Sensory: No sensory deficit.      Coordination: Coordination normal.   Psychiatric:         Behavior: Behavior normal.         Thought Content: Thought content normal.         Judgment: Judgment normal.        Result Review :     UA    Urinalysis 1/17/23 2/17/23   Ketones, UA Negative Negative   Leukocytes, UA Negative Negative           Urine Culture    Urine Culture 1/17/23 2/1/23   Urine Culture 25,000 CFU/mL Enterococcus faecalis (A) 50,000 CFU/mL Mixed Amna Isolated   (A) Abnormal value            v     Assessment and Plan    Problem List Items Addressed This Visit        Genitourinary and Reproductive     Recurrent UTI - Primary    Relevant Orders    POC Urinalysis Dipstick, Automated (Completed)    Dysuria    Relevant Medications    fluconazole (DIFLUCAN) 100 MG tablet    phenazopyridine (Pyridium) 200 MG tablet    estradiol (ESTRACE) 0.1 MG/GM vaginal cream    Yeast vaginitis    Relevant Medications    fluconazole (DIFLUCAN) 100 MG tablet    Atrophic vaginitis    Relevant Medications    estradiol (ESTRACE) 0.1 MG/GM vaginal cream    Female dyspareunia   Other Visit Diagnoses     Frequency of  urination        Relevant Medications    phenazopyridine (Pyridium) 200 MG tablet    estradiol (ESTRACE) 0.1 MG/GM vaginal cream    Acute cystitis with hematuria        Relevant Medications    phenazopyridine (Pyridium) 200 MG tablet    estradiol (ESTRACE) 0.1 MG/GM vaginal cream                                                 ASSESSMENT  Recurrent urinary tract infections/OverActiveBladder/Atrophic/yeast vaginitis/dysparupnea:   Ms. Albert is a pleasant 62-year-old female who presents to clinic today for evaluation with concerns of RECURRENT bladder infections post numerous antibiotic therapy,with recurrent yeast vaginitis.. she reports dyspareunia, she reports atrophic vaginitis.  Her urine dipstick today is unremarkable.      Again,  We discussed the types of organisms that are found in the urinary tract indicating that the vast majority are results of the patient's own gastrointestinal roma.  We discussed how many of the antibiotics that are utilized can actually exacerbate these infections by creating resistant organisms and there is only a very few antibiotics that are concentrated in the urine and do not affect the rectal reservoir nor cause recurrent yeast vaginitis.       We discussed the risk factors for recurrent infections being intercourse in younger patients and atrophic changes in older patients.  We discussed the symptoms that are found including pain, pressure, burning, frequency, urgency suprapubic pain and painful intercourse.  I discussed upper tract symptoms including fevers, chills, and indicated the workup would be much more aggressive if the patient were to present with recurrent infections in the face of upper tract symptomatology such as fever. I discussed the history of vesicoureteral reflux in young patients and finally chronic renal scarring as a result of such.      Vaginal Pain/Dysuria/dyspareunia: Patient reports vaginal pain and burning with urination that has been ongoing . She  reports, She reports severe irritation and painful intercourse. WE Discussed post menopausal Vaginal atrophy (atrophic vaginitis) with patient as the  thinning, drying and inflammation of the vaginal walls that may occur when womens body has less estrogen.Discussed the facts that Vaginal atrophy occurs most often after menopause and may also lead to distressing urinary symptoms such as the burning and pain she is experiencing.                                                  PLAN  We resent her urine for culture. I will call her with results if any bacteria growth     We discussed ONLY treating infections if they are positive.  In that case will use a mild antibiotic such as Macrobid for suppressive therapy if need be.       In the meantime, she has been encouraged to  increase her p.o. fluid intake to at least 1 to 2 L daily and avoid bladder irritants such as caffeine products, spicy foods, and citrusy foods.      I recommend concomitant probiotics with treatment with antibiotics to protect the rectal reservoir including over-the-counter yogurt preparations to alena oral pills containing the appropriate probiotics.     She is to also continue other medications for yeast vaginitis, atrophic vaginitis as prescribed above.     CONTINUE Vaginal Estrace Cream as directed(place on  tip of finger and apply to vagina nightly x 2 weeks, then 2-3 times weekly).,  Also utilize coconut oil prior to intercourse, patient unable to tolerate K-Y jelly     We discussed lower tract investigation, patient has been scheduled for cystoscopy with Dr. Centeno on 03/27/23     Will see her back in 1 months for Follow up in clinic and recheck her urinalysis at that time.     Patient encouraged to return sooner if symptoms worsen     Patient is agreeable to plan of care.  Patient reports that she is not currently experiencing any symptoms of urinary incontinence.    Class 2 Severe Obesity (BMI >=35 and <=39.9). Obesity-related health  conditions include the following: obstructive sleep apnea, hypertension, coronary heart disease, dyslipidemias, GERD and urinary stress incontinence. Obesity is improving with lifestyle modifications. BMI is 35.53KG/M2. We discussed portion control and increasing exercise.      RADIOLOGY (CT AND/OR KUB):    CT Abdomen and Pelvis: No results found for this or any previous visit.     CT Stone Protocol: No results found for this or any previous visit.     KUB: No results found for this or any previous visit.       LABS (3 MONTHS):    Office Visit on 02/17/2023   Component Date Value Ref Range Status   • Color 02/17/2023 Yellow  Yellow, Straw, Dark Yellow, Maria Eugenia Final   • Clarity, UA 02/17/2023 Clear  Clear Final   • Specific Gravity  02/17/2023 1.030  1.005 - 1.030 Final   • pH, Urine 02/17/2023 6.0  5.0 - 8.0 Final   • Leukocytes 02/17/2023 Negative  Negative Final   • Nitrite, UA 02/17/2023 Negative  Negative Final   • Protein, POC 02/17/2023 Negative  Negative mg/dL Final   • Glucose, UA 02/17/2023 Negative  Negative mg/dL Final   • Ketones, UA 02/17/2023 Negative  Negative Final   • Urobilinogen, UA 02/17/2023 Normal  Normal, 0.2 E.U./dL Final   • Bilirubin 02/17/2023 Negative  Negative Final   • Blood, UA 02/17/2023 Negative  Negative Final   • Lot Number 02/17/2023 [id number]   Final   • Expiration Date 02/17/2023 2/8/24   Final   Lab on 02/01/2023   Component Date Value Ref Range Status   • Urine Culture 02/01/2023 50,000 CFU/mL Mixed Amna Isolated   Final   Office Visit on 01/17/2023   Component Date Value Ref Range Status   • Color 01/17/2023 Yellow  Yellow, Straw, Dark Yellow, Maria Eugenia Final   • Clarity, UA 01/17/2023 Cloudy (A)  Clear Final   • Specific Gravity  01/17/2023 1.015  1.005 - 1.030 Final   • pH, Urine 01/17/2023 6.0  5.0 - 8.0 Final   • Leukocytes 01/17/2023 Negative  Negative Final   • Nitrite, UA 01/17/2023 Negative  Negative Final   • Protein, POC 01/17/2023 Negative  Negative mg/dL Final    • Glucose, UA 01/17/2023 Negative  Negative mg/dL Final   • Ketones, UA 01/17/2023 Negative  Negative Final   • Urobilinogen, UA 01/17/2023 Normal  Normal, 0.2 E.U./dL Final   • Bilirubin 01/17/2023 Negative  Negative Final   • Blood, UA 01/17/2023 Negative  Negative Final   • Lot Number 01/17/2023 n   Final   • Expiration Date 01/17/2023 n   Final   • Urine Culture 01/17/2023 25,000 CFU/mL Enterococcus faecalis (A)   Final            Smoking Cessation Counseling:  Never a smoker.  Patient does not currently use any tobacco products.       Follow Up   Return in about 5 weeks (around 3/27/2023) for Next scheduled follow up, RECURRENT UTI/DYSURIA/DYSPARUPNEA/, With Dr. Centeno, Cystoscopy.    Patient was given instructions and counseling regarding her condition or for health maintenance advice. Please see specific information pulled into the AVS if appropriate.          This document has been electronically signed by Griselda Cheng-Akwa, APRN   February 20, 2023 18:29 EST  Transcribed from ambient dictation for Griselda Cheng-Akwa, APRN by Mariela Trejo.  02/17/23   12:29 EST    Patient or patient representative verbalized consent to the visit recording.      Dictated Utilizing Dragon Dictation: Part of this note may be an electronic transcription/translation of spoken language to printed text using the Dragon Dictation System.

## 2023-02-20 PROBLEM — N94.10 FEMALE DYSPAREUNIA: Status: ACTIVE | Noted: 2023-02-20

## 2023-02-20 PROBLEM — N95.2 ATROPHIC VAGINITIS: Status: ACTIVE | Noted: 2023-02-20

## 2023-02-20 PROBLEM — N39.0 RECURRENT UTI: Status: ACTIVE | Noted: 2023-02-20

## 2023-02-20 PROBLEM — B37.31 YEAST VAGINITIS: Status: ACTIVE | Noted: 2023-02-20

## 2023-02-20 PROBLEM — R30.0 DYSURIA: Status: ACTIVE | Noted: 2023-02-20

## 2023-02-21 ENCOUNTER — TELEPHONE (OUTPATIENT)
Dept: UROLOGY | Facility: CLINIC | Age: 63
End: 2023-02-21
Payer: COMMERCIAL

## 2023-02-24 ENCOUNTER — TELEPHONE (OUTPATIENT)
Dept: CARDIOLOGY | Facility: CLINIC | Age: 63
End: 2023-02-24

## 2023-02-24 NOTE — TELEPHONE ENCOUNTER
Caller: Xochitl Albert    Relationship to patient: Self    Best call back number: [phone]    Chief complaint: FLUCTUATING HIGH BLOOD PRESSURE, AND AFIB SENSATION     Type of visit: FOLLOW UP     Requested date: ANY DAY AFTER 10AM     Additional notes: PATIENT HAS NOT BEEN SEEN SINCE 3.12.21 AND WOULD LIKE TO COME IN FOR A ROUTINE FOLLOW UP AS WELL TO DISCUSS SOME CONCERNS.

## 2023-02-24 NOTE — TELEPHONE ENCOUNTER
The PM called to notify the pt that our office will get her scheduled ASAP for a f/u and call her with this appt date and time.  Message sent to Ewa to schedule the pt with JR ASAP.

## 2023-03-01 ENCOUNTER — OFFICE VISIT (OUTPATIENT)
Dept: CARDIOLOGY | Facility: CLINIC | Age: 63
End: 2023-03-01
Payer: COMMERCIAL

## 2023-03-01 VITALS
HEIGHT: 61 IN | BODY MASS INDEX: 35.76 KG/M2 | DIASTOLIC BLOOD PRESSURE: 94 MMHG | OXYGEN SATURATION: 99 % | WEIGHT: 189.4 LBS | HEART RATE: 103 BPM | SYSTOLIC BLOOD PRESSURE: 176 MMHG

## 2023-03-01 DIAGNOSIS — R00.2 PALPITATIONS: Primary | ICD-10-CM

## 2023-03-01 DIAGNOSIS — I10 PRIMARY HYPERTENSION: ICD-10-CM

## 2023-03-01 PROCEDURE — 99214 OFFICE O/P EST MOD 30 MIN: CPT | Performed by: NURSE PRACTITIONER

## 2023-03-01 PROCEDURE — 93000 ELECTROCARDIOGRAM COMPLETE: CPT | Performed by: NURSE PRACTITIONER

## 2023-03-01 RX ORDER — OLMESARTAN MEDOXOMIL 5 MG/1
2.5 TABLET ORAL DAILY
Qty: 15 TABLET | Refills: 3 | Status: SHIPPED | OUTPATIENT
Start: 2023-03-01

## 2023-03-01 RX ORDER — CETIRIZINE HYDROCHLORIDE 10 MG/1
TABLET ORAL EVERY 24 HOURS
COMMUNITY
End: 2023-03-01

## 2023-03-01 NOTE — PROGRESS NOTES
Subjective     Xochitl Albert is a 62 y.o. female who presents to day for Elevated Blood Pressure (HIGH BP).    CHIEF COMPLIANT  Chief Complaint   Patient presents with   • Elevated Blood Pressure     HIGH BP       Active Problems:      Problem list  1.  Hypertension  1.1 echocardiogram 2/21: EF equal or greater than 50%, excluded hemodynamically severe regurgitation or stenosis  2.  Rapid heart rate  3.  Lightheadedness    Problem List Items Addressed This Visit        Cardiac and Vasculature    Palpitations - Primary   Other Visit Diagnoses     Primary hypertension              HPI  HPI   Xochitl Albert is a 62-year-old female who is being followed up today for high blood pressure. Patient is currently on no blood pressure medications.    Ms. Albert has a history of hypertension and was taking lisinopril 2.5 mg, but only took 0.5 tablet. However, she developed a cough. She asked her primary care provider, Dr. Vega, who advised her to stop taking it. Dr. Vega prescribed another blood pressure medication, but she read that it can cause lightheadedness. She was afraid to start the new medication because she already deals with getting lightheaded. The patient states her blood pressure fluctuates between 116/70 mmHg and 130 mmHg systolic. She states that on occasion, like when she eats, her systolic blood pressure will increase to 161 mmHg. Her blood pressure seems to be lower in the evenings. The patient's blood pressure is elevated today at 179/94 mmHg. She reports having white coat syndrome. She checked her blood pressure right before she came to her appointment and it was 137/94 mmHg. She states her heart rate at home was 77 beats per minute last night. Her heart rate was 103 beats per minute today in the office.    The patient states she had a severe dizzy spell in 07/2022. She was preparing to go to the kitchen when the room started slowly spinning and then became faster and faster. She grabbed the sink  "because she thought she was going to fall. The dizziness suddenly subsided. She went to the emergency room because she had never experienced an episode like that before, and they believed it was vertigo. She was told she had fluid in her ear. Her primary care provider thought her dizziness was caused by her having had COVID-19 infections 3 times. She was then referred to a neurologist in [place], [place] who performed an MRI, which was normal. She has an appointment with the otolaryngologist on 03/21/2023. She has not been feeling really good, feeling like her legs are weak and her balance is not good. She is unaware if her blood pressure is elevated at this time.     She denies chest tightness, chest pressure, chest pain, dyspnea, lower extremity edema. She does feel her heart flutter on occasion, which she thought was her hiatal hernia acting up. She admits to drinking a lot of caffeine. The palpitations are short-lived and do not occur daily, but lately they have increased in frequency. At times she has felt \"it quiver\" in her throat.  Her palpitations are exacerbated by certain foods. She has not noticed that activity affects her palpitations. She denies any associated symptoms.     The patient states she has hyperlipidemia. Her last lipid panel was in 2017. She thinks her last lipid panel was extremely elevated. She does not currently take any medications for high cholesterol. She has tried Lipitor, Crestor, and Zetia.     She denies fatigue. She denies nasal congestion. She admits to sinus drainage and postnasal drip. She has been utilizing Zyrtec and Singulair which have been helpful in clearing up some of her sinus problems. She denies eye issues.    She denies nausea, emesis, or diarrhea. She admits to minor constipation. She denies hematochezia. She denies polydipsia or polyphagia. She admits to occasional nocturia.    She admits to arthritis and bone spurs. She denies rash or wounds. She admits to " easy bruising.    She denies syncope. She admits to having an occasional common headache.    She denies waking up gasping for air. However, on occasion, she has woken up feeling like she is holding her breath.     She is allergic to SULFA.      PRIOR MEDS  Current Outpatient Medications on File Prior to Visit   Medication Sig Dispense Refill   • cetirizine (zyrTEC) 10 MG tablet Take 1 tablet by mouth Daily.     • cholecalciferol (VITAMIN D3) 25 MCG (1000 UT) tablet Take 1 tablet by mouth Daily.     • lansoprazole (PREVACID) 30 MG capsule Take  by mouth Daily.     • montelukast (SINGULAIR) 10 MG tablet Take 1 tablet by mouth Daily.     • [DISCONTINUED] fluconazole (DIFLUCAN) 100 MG tablet Take 1 tablet by mouth 2 (Two) Times a Week. 10 tablet 1   • [DISCONTINUED] phenazopyridine (Pyridium) 200 MG tablet Take 1 tablet by mouth 3 (Three) Times a Day As Needed for Bladder Spasms. 20 tablet 0   • [DISCONTINUED] Probiotic Product (Align) capsule Take 1 capsule by mouth Daily. 90 capsule 3   • [DISCONTINUED] cetirizine (zyrTEC) 10 MG tablet Daily.     • [DISCONTINUED] estradiol (ESTRACE) 0.1 MG/GM vaginal cream estradiol 0.01% (0.1 mg/gram) vaginal cream     • [DISCONTINUED] estradiol (ESTRACE) 0.1 MG/GM vaginal cream DO NOT USE SYRINGE!!!. PUT A PEA SIZE ON YOUR INDEX FINGER, APPLY TO VAGINA TWICE WEEKLY 42.5 g 12   • [DISCONTINUED] fluconazole (DIFLUCAN) 100 MG tablet fluconazole 100 mg tablet     • [DISCONTINUED] lansoprazole (PREVACID) 30 MG capsule Daily.     • [DISCONTINUED] lisinopril (PRINIVIL,ZESTRIL) 2.5 MG tablet lisinopril 2.5 mg tablet       No current facility-administered medications on file prior to visit.       ALLERGIES  Iodine, Amoxicillin-pot clavulanate, Bactrim [sulfamethoxazole-trimethoprim], Lisinopril, Omnicef [cefdinir], and Sulfa antibiotics    HISTORY  Past Medical History:   Diagnosis Date   • Arrhythmia    • COVID-19 11/2020   • Edema    • Fatigue    • Hiatal hernia    • Hyperlipidemia    •  "Leaky heart valve    • Palpitations    • Urinary tract infection        Social History     Socioeconomic History   • Marital status:    Tobacco Use   • Smoking status: Never   Substance and Sexual Activity   • Alcohol use: No   • Drug use: No   • Sexual activity: Defer       Family History   Problem Relation Age of Onset   • Cancer Father    • Hypertension Father    • Hyperlipidemia Father    • Hypertension Mother        Review of Systems   Constitutional: Negative for fatigue.   HENT: Positive for postnasal drip and rhinorrhea. Negative for congestion.    Eyes: Negative.  Negative for visual disturbance.   Respiratory: Negative for chest tightness and shortness of breath.    Cardiovascular: Positive for palpitations (FLUTTER). Negative for chest pain and leg swelling.   Gastrointestinal: Positive for blood in stool (ON COLOGUARD NOT VISIBLE) and constipation. Negative for diarrhea, nausea and vomiting.   Endocrine: Positive for polyuria. Negative for polydipsia and polyphagia.   Genitourinary: Positive for dysuria.   Musculoskeletal: Positive for arthralgias, joint swelling and myalgias.   Skin: Negative for rash and wound.   Allergic/Immunologic: Positive for environmental allergies.   Neurological: Positive for dizziness, weakness (IN LEGS), light-headedness (OFF BALANCE, ) and headaches. Negative for syncope.   Hematological: Bruises/bleeds easily.   Psychiatric/Behavioral: Positive for sleep disturbance ( + PND, -ORTHOPNEA).       Objective     VITALS: /94   Pulse 103   Ht 154.9 cm (60.98\")   Wt 85.9 kg (189 lb 6.4 oz)   SpO2 99%   BMI 35.81 kg/m²     LABS:   Lab Results (most recent)     None          IMAGING:   No Images in the past 120 days found..    EXAM:  Physical Exam  Vitals and nursing note reviewed.   Constitutional:       Appearance: She is well-developed.   HENT:      Head: Normocephalic.   Eyes:      Pupils: Pupils are equal, round, and reactive to light.   Neck:      Thyroid: No " thyroid mass.      Vascular: No carotid bruit or JVD.      Trachea: Trachea and phonation normal.   Cardiovascular:      Rate and Rhythm: Normal rate and regular rhythm.      Pulses:           Radial pulses are 2+ on the right side and 2+ on the left side.        Posterior tibial pulses are 2+ on the right side and 2+ on the left side.      Heart sounds: Normal heart sounds. No murmur heard.    No friction rub. No gallop.   Pulmonary:      Effort: Pulmonary effort is normal. No respiratory distress.      Breath sounds: Normal breath sounds. No wheezing or rales.   Abdominal:      General: Bowel sounds are normal.      Palpations: Abdomen is soft.   Musculoskeletal:         General: Swelling (TRACE) present. Normal range of motion.      Cervical back: Neck supple.   Skin:     General: Skin is warm and dry.      Capillary Refill: Capillary refill takes less than 2 seconds.      Findings: No rash.   Neurological:      Mental Status: She is alert and oriented to person, place, and time.   Psychiatric:         Speech: Speech normal.         Behavior: Behavior normal.         Thought Content: Thought content normal.         Judgment: Judgment normal.         Procedure     ECG 12 Lead    Date/Time: 3/5/2023 10:29 PM  Performed by: Boaz Moore APRN  Authorized by: Boaz Moore APRN   Comparison: compared with previous ECG from 12/8/2020  Similar to previous ECG  Rhythm: sinus rhythm  Rate: normal  BPM: 87  QRS axis: normal  Comments: QTc 433 ms  No acute changes               Assessment & Plan    Diagnosis Plan   1. Palpitations        2. Primary hypertension        Plan  1. She experiences mild intermittent palpitations that have increased in frequency. The patient was encouraged to contact the office with any changes.  At that time a cardiac monitor will be considered for further medication recommendations  2. The patient has been experiencing elevated blood pressure. She does not currently take any medication  for hypertension. She was prescribed olmesartan 2.5 mg daily. Prescription for olmesartan 5 mg has been sent. She is to take 0.5 tablet daily. The patient was encouraged to monitor her blood pressure every morning, 1 hour after she takes her medication, and to keep a log. She will contact the office in approximately 1 week with an update regarding the olmesartan.  Patient is very concerned about trying medications.   3. Discussed starting Repatha for hyperlipidemia treatment. She was given additional information regarding Repatha. A request for recent lab work from Dr. Vega has been sent.  At this time she wishes to defer.  4.  Informed of signs and symptoms of ACS and advised to seek emergent treatment for any new worsening symptoms.  Patient also advised sooner follow-up as needed.  Also advised to follow-up with family doctor as needed  This note is dictated utilizing voice recognition software.  Although this record has been proof read, transcriptional errors may still be present. If questions occur regarding the content of this record please do not hesitate to call our office.  I have reviewed and confirmed the accuracy of the ROS as documented by the MA/LPN/RN KENNA Duke    No follow-ups on file.    Diagnoses and all orders for this visit:    1. Palpitations (Primary)    2. Primary hypertension          Assessment  1. Palpitations, primary  2. Hypertension  3. Hyperlipidemia    Patient will follow up in 6 months      Xochitl Albert  reports that she has never smoked. She does not have any smokeless tobacco history on file..          MEDS ORDERED DURING VISIT:  No orders of the defined types were placed in this encounter.          This document has been electronically signed by KENNA Duke Jr.  March 1, 2023 16:19 EST     Transcribed from ambient dictation for KENNA Duke by Delisa Nino.  03/01/23   19:16 EST    Patient or patient representative verbalized consent to the  visit recording.  I have personally performed the services described in this document as transcribed by the above individual, and it is both accurate and complete.

## 2023-03-05 PROCEDURE — 93000 ELECTROCARDIOGRAM COMPLETE: CPT | Performed by: NURSE PRACTITIONER

## 2023-03-16 ENCOUNTER — TELEPHONE (OUTPATIENT)
Dept: UROLOGY | Facility: CLINIC | Age: 63
End: 2023-03-16

## 2024-01-17 ENCOUNTER — OFFICE VISIT (OUTPATIENT)
Dept: CARDIOLOGY | Facility: CLINIC | Age: 64
End: 2024-01-17
Payer: COMMERCIAL

## 2024-01-17 VITALS
DIASTOLIC BLOOD PRESSURE: 96 MMHG | BODY MASS INDEX: 35.19 KG/M2 | WEIGHT: 186.4 LBS | HEART RATE: 102 BPM | HEIGHT: 61 IN | SYSTOLIC BLOOD PRESSURE: 178 MMHG | OXYGEN SATURATION: 98 %

## 2024-01-17 DIAGNOSIS — R26.89 IMBALANCE: ICD-10-CM

## 2024-01-17 DIAGNOSIS — I38 LEAKY HEART VALVE: ICD-10-CM

## 2024-01-17 DIAGNOSIS — H93.11 TINNITUS OF RIGHT EAR: ICD-10-CM

## 2024-01-17 DIAGNOSIS — I38 HEART VALVE REGURGITATION: ICD-10-CM

## 2024-01-17 DIAGNOSIS — R29.898 WEAKNESS OF BOTH LOWER EXTREMITIES: ICD-10-CM

## 2024-01-17 DIAGNOSIS — R42 DIZZINESS: ICD-10-CM

## 2024-01-17 DIAGNOSIS — E78.2 MIXED HYPERLIPIDEMIA: Primary | ICD-10-CM

## 2024-01-17 DIAGNOSIS — H55.00 NYSTAGMUS: ICD-10-CM

## 2024-01-17 DIAGNOSIS — R00.2 PALPITATIONS: ICD-10-CM

## 2024-01-17 PROBLEM — E78.5 HYPERLIPIDEMIA: Status: RESOLVED | Noted: 2021-03-12 | Resolved: 2024-01-17

## 2024-01-17 PROCEDURE — 99214 OFFICE O/P EST MOD 30 MIN: CPT | Performed by: INTERNAL MEDICINE

## 2024-01-17 RX ORDER — MECLIZINE HYDROCHLORIDE 25 MG/1
12.5 TABLET ORAL 3 TIMES DAILY PRN
COMMUNITY

## 2024-01-17 RX ORDER — CHOLECALCIFEROL (VITAMIN D3) 50 MCG
1 TABLET ORAL
COMMUNITY
Start: 2023-11-21

## 2024-01-17 RX ORDER — ESTRADIOL 0.1 MG/G
CREAM VAGINAL
COMMUNITY

## 2024-01-17 NOTE — PROGRESS NOTES
Subjective   Xochitl Albert is a 63 y.o. female     Chief Complaint   Patient presents with    Follow-up     Here for 6 mo. F/u    Hyperlipidemia    Hypertension    Palpitations       PROBLEM LIST:     1.  Hypertension  1.1 echocardiogram 2/21: EF equal or greater than 50%, excluded hemodynamically severe regurgitation or stenosis  2. Hyperlipidemia  3. Palpitations        Specialty Problems          Cardiology Problems    Hyperlipidemia        Leaky heart valve        Palpitations        Heart valve regurgitation        Hyperlipidemia        Palpitations             HPI:    Ms. Albert returns for follow-up on the above.    Her complaint today is of chronic imbalance, dizziness, and hearing loss in the right ear.  She describes that, ever since an episode of acute labyrinthitis after her third round of COVID infection she has not felt well.  The the vertigo that she initially felt has completely resolved.  However, she still feels imbalance and a sense of unease whenever she moves her head rapidly particularly from the right to the left side, or on lateral gaze in both directions.  She describes hearing loss in the right ear.  Notes describe that the patient underwent MRI at [facility] and was told she had no pathology.  Records also described the patient underwent physical therapy with no improvement.    The patient denies chest pain, orthopnea, PND, or lower extremity edema.  She senses palpitations as a fluttering in her chest but these are never associated with her neurologic symptoms as described above.    The patient also continues to be hypertensive when measured at office visits.  However, she describes that she checks her blood pressures regularly at home with systolic blood pressures nearly always in the 130s and lower with systolic pressures frequently in the 1 teens.  She describes that she has checked her sphygmomanometer with her physician's office and it readings were accurate.                PRIOR  MEDICATIONS    Current Outpatient Medications on File Prior to Visit   Medication Sig Dispense Refill    cetirizine (zyrTEC) 10 MG tablet Take 1 tablet by mouth Daily As Needed.      Cholecalciferol (Vitamin D) 50 MCG (2000 UT) tablet Take 1 tablet by mouth. Occas.      estradiol (ESTRACE) 0.1 MG/GM vaginal cream prn      lansoprazole (PREVACID) 30 MG capsule Take  by mouth Daily.      meclizine (ANTIVERT) 25 MG tablet Take 0.5 tablets by mouth 3 (Three) Times a Day As Needed.      montelukast (SINGULAIR) 10 MG tablet Take 1 tablet by mouth Daily.      [DISCONTINUED] cholecalciferol (VITAMIN D3) 25 MCG (1000 UT) tablet Take 1 tablet by mouth Daily.      [DISCONTINUED] olmesartan (BENICAR) 5 MG tablet Take 0.5 tablets by mouth Daily. 15 tablet 3     No current facility-administered medications on file prior to visit.       ALLERGIES:    Iodine, Amoxicillin-pot clavulanate, Bactrim [sulfamethoxazole-trimethoprim], Lisinopril, Omnicef [cefdinir], and Sulfa antibiotics    PAST MEDICAL HISTORY:    Past Medical History:   Diagnosis Date    Arrhythmia     Edema     Fatigue     Hiatal hernia     History of breast abscess 05/2022    right breast    History of COVID-19 11/2020    History of recurrent UTI (urinary tract infection)     History of shingles 2020    Hyperlipidemia     Hypertension     Leaky heart valve     Palpitations        SURGICAL HISTORY:    History reviewed. No pertinent surgical history.    SOCIAL HISTORY:    Social History     Socioeconomic History    Marital status:    Tobacco Use    Smoking status: Never   Substance and Sexual Activity    Alcohol use: No    Drug use: No    Sexual activity: Defer       FAMILY HISTORY:    Family History   Problem Relation Age of Onset    Cancer Father     Hypertension Father     Hyperlipidemia Father     Hypertension Mother        Review of Systems   HENT:  Positive for tinnitus.    Eyes:  Positive for visual disturbance (reading glasses).   Respiratory: Negative.    "  Cardiovascular:  Positive for palpitations and leg swelling. Negative for chest pain.   Gastrointestinal: Negative.    Endocrine: Negative.    Genitourinary: Negative.    Musculoskeletal:  Positive for arthralgias and myalgias.   Skin: Negative.    Allergic/Immunologic: Positive for environmental allergies.   Neurological:  Positive for dizziness, weakness (leg weakness) and light-headedness. Negative for syncope.        Imbalance   Hematological:  Bruises/bleeds easily.   Psychiatric/Behavioral:  Positive for sleep disturbance. Self-injury: off and on.       VISIT VITALS:  Vitals:    01/17/24 1127   BP: 178/96   BP Location: Left arm   Patient Position: Sitting   Pulse: 102   SpO2: 98%   Weight: 84.6 kg (186 lb 6.4 oz)   Height: 154.9 cm (60.98\")      /96 (BP Location: Left arm, Patient Position: Sitting)   Pulse 102   Ht 154.9 cm (60.98\")   Wt 84.6 kg (186 lb 6.4 oz)   SpO2 98%   BMI 35.24 kg/m²     RECENT LABS:    Objective       Physical Exam  Vitals and nursing note reviewed.   Constitutional:       General: She is not in acute distress.     Appearance: She is well-developed.   HENT:      Head: Normocephalic and atraumatic.   Eyes:      Extraocular Movements:      Right eye: Nystagmus present.      Left eye: Nystagmus present.      Conjunctiva/sclera: Conjunctivae normal.      Pupils: Pupils are equal, round, and reactive to light.      Comments: No ptosis or lid lag  Extra ocular motions intact  5 beats nystagmus bilat. On lateral gaze   Neck:      Vascular: No carotid bruit, hepatojugular reflux or JVD.      Trachea: No tracheal deviation.      Comments: Nl. Carotid upstrokes  Cardiovascular:      Rate and Rhythm: Normal rate and regular rhythm.      Pulses:           Radial pulses are 2+ on the right side and 2+ on the left side.      Heart sounds: Normal heart sounds, S1 normal and S2 normal. No murmur heard.     No friction rub. S4 sounds present. No S3 sounds.   Pulmonary:      Effort: " "Pulmonary effort is normal.      Breath sounds: Normal breath sounds. No wheezing, rhonchi or rales.      Comments: Nl. Expir. Phase  Nl. Breath sound intensity  Abdominal:      General: Bowel sounds are normal. There is no distension or abdominal bruit.      Palpations: Abdomen is soft. There is no mass.      Tenderness: There is no abdominal tenderness. There is no guarding or rebound.      Comments: No organomegaly   Musculoskeletal:         General: No tenderness or deformity. Normal range of motion.      Cervical back: Normal range of motion and neck supple.      Right lower leg: No edema.      Left lower leg: No edema.      Comments: LLE, no edema, palpable pedal pulses, cap. Refill  2 sec.   RLE, no edema, palpable PT pedal pulse, cap. Refill 2 sec.    Skin:     General: Skin is warm and dry.      Coloration: Skin is not pale.      Findings: No erythema or rash.   Neurological:      Mental Status: She is alert and oriented to person, place, and time.   Psychiatric:         Behavior: Behavior normal.         Thought Content: Thought content normal.         Judgment: Judgment normal.         Procedures      Assessment & Plan   #1.  Systemic hypertension.  Clearly, the patient has \"whitecoat hypertension\" and has been intolerant of antihypertensive therapy in the past because of symptomatic hypotension at home.  We will not, therefore, recommend pharmacotherapy.    2.  Severe dyslipidemia with LDL of greater than 200.  The patient has failed multiple statin medications because of muscle and joint pain.  We will refer her to our lipid clinic for consideration of another trial of statin with in conjunction with co-Q10 and tonic water or PSK 9 inhibitor or small inhibiting RNA therapies.    3.  Neurologic symptoms as described above.  I do not believe that the patient's ongoing symptoms have a cardiovascular source.  However, we will perform carotid duplex study to look at vertebral circulation and rule out basilar " ischemia as a cause of vertigo and dizziness.    4.  Palpitations.  [id number] 2 score is 2 for gender and age.  Therefore, we will perform 30-day event monitor to assess for atrial fibrillation which would require full dose anticoagulation for stroke prophylaxis.    5.  The patient will follow with Dr. Vega as instructed.  We will plan on seeing her in follow-up in 1 year or on appearing basis for testing as discussed in detail with the patient today.   Diagnosis Plan   1. Mixed hyperlipidemia        2. Leaky heart valve        3. Palpitations        4. Heart valve regurgitation            No follow-ups on file.         Xochitl Albert  reports that she has never smoked. She does not have any smokeless tobacco history on file.. I have educated her on the risk of diseases from using tobacco products such as cancer, COPD, and heart disease.                             Electronically signed by:    Scribed for Jeremiah Granado MD by Domenica Xiong LPN on January 17, 2024  at 11:33 EST    I, Jeremiah Granado MD personally performed the services described in this documentation as scribed by the above named individual in my presence, and it is both accurate and complete. January 17, 2024 11:33 EST      Dictated Utilizing Dragon Dictation: Part of this note may be an electronic transcription/translation of spoken language to printed text using the Dragon Dictation System.

## 2024-01-18 ENCOUNTER — LAB (OUTPATIENT)
Dept: LAB | Facility: HOSPITAL | Age: 64
End: 2024-01-18
Payer: COMMERCIAL

## 2024-01-18 ENCOUNTER — TELEPHONE (OUTPATIENT)
Dept: CARDIOLOGY | Facility: CLINIC | Age: 64
End: 2024-01-18
Payer: COMMERCIAL

## 2024-01-18 DIAGNOSIS — E78.2 MIXED HYPERLIPIDEMIA: ICD-10-CM

## 2024-01-18 LAB
ALBUMIN SERPL-MCNC: 4 G/DL (ref 3.5–5.2)
ALP SERPL-CCNC: 122 U/L (ref 39–117)
ALT SERPL W P-5'-P-CCNC: 12 U/L (ref 1–33)
AST SERPL-CCNC: 13 U/L (ref 1–32)
BILIRUB CONJ SERPL-MCNC: <0.2 MG/DL (ref 0–0.3)
BILIRUB INDIRECT SERPL-MCNC: ABNORMAL MG/DL
BILIRUB SERPL-MCNC: 0.2 MG/DL (ref 0–1.2)
CHOLEST SERPL-MCNC: 294 MG/DL (ref 0–200)
HDLC SERPL-MCNC: 52 MG/DL (ref 40–60)
LDLC SERPL CALC-MCNC: 213 MG/DL (ref 0–100)
LDLC/HDLC SERPL: 4.06 {RATIO}
PROT SERPL-MCNC: 7.1 G/DL (ref 6–8.5)
TRIGL SERPL-MCNC: 154 MG/DL (ref 0–150)
VLDLC SERPL-MCNC: 29 MG/DL (ref 5–40)

## 2024-01-18 PROCEDURE — 80076 HEPATIC FUNCTION PANEL: CPT | Performed by: INTERNAL MEDICINE

## 2024-01-18 PROCEDURE — 80061 LIPID PANEL: CPT | Performed by: INTERNAL MEDICINE

## 2024-01-18 NOTE — TELEPHONE ENCOUNTER
Patient was inquiring on referral to neuro. ENT in [place]. Aware someone maureen contact her with appt. Date and time. She states she has roxanna ppt. With the lipid speci[facility]. Clinic next Friday and that they will discuss with her what chol. Med will be avail. Per auth. By her insur. Verbalized ok. AMINA,LPN

## 2024-01-18 NOTE — TELEPHONE ENCOUNTER
Caller: Xochitl Albert    Relationship: Self    Best call back number: [phone]    What is the best time to reach you: ANY      What was the call regarding: PATIENT INQUIRING ABOUT SEEING A DOCTOR IN [place] PER DR. ALMAGUER. PLEASE CALL HER TO DISCUSS ASAP    Is it okay if the provider responds through MyChart: NO

## 2024-01-19 ENCOUNTER — TELEPHONE (OUTPATIENT)
Dept: CARDIOLOGY | Facility: CLINIC | Age: 64
End: 2024-01-19
Payer: COMMERCIAL

## 2024-01-19 NOTE — TELEPHONE ENCOUNTER
Patient had gotten an alert on her phone about some results. I told her Dr. Granado has not reviewed them yet and current treatment will not change due to results. Verbalized ok. PH,LPN

## 2024-01-19 NOTE — TELEPHONE ENCOUNTER
Caller: Xochitl Albert    Relationship: Self    Best call back number: [phone]    Caller requesting test results: SELF    What test was performed: LAB WORK    When was the test performed: 01.18.24    Where was the test performed: [facility]

## 2024-01-24 ENCOUNTER — TELEPHONE (OUTPATIENT)
Dept: CARDIOLOGY | Facility: CLINIC | Age: 64
End: 2024-01-24
Payer: COMMERCIAL

## 2024-01-24 NOTE — TELEPHONE ENCOUNTER
Caller: Xochitl Albert    Relationship to patient: Self    Best call back number:     Chief complaint:     Type of visit:  INITIAL LEQVIO APPT.    Requested date:      If rescheduling, when is the original appointment:  1-26-24    Additional notes: PATIENT WAS SUPPOSED TO HAVE AN APPOINTMENT ON 1-26-24 BUT HAS A STOMACH BUG AND NEEDS TO RESCHEDULE. APPOINTMENT WAS TO START CHOLESTEROL SHOT

## 2024-01-26 ENCOUNTER — TELEPHONE (OUTPATIENT)
Dept: CARDIOLOGY | Facility: CLINIC | Age: 64
End: 2024-01-26

## 2024-01-26 NOTE — TELEPHONE ENCOUNTER
Caller: Xochitl Albert    Relationship: Self    Best call back number: [phone]    What is the best time to reach you: ANY    What was the call regarding: PATIENT IS INQUIRING AS TO WHAT IS GOING ON WITH THE ENT REFERRAL. SHE WOULD LIKE TO KNOW THE 'S NAME SHE WILL SEE SO SHE CAN CONTACT HIM. PLEASE CALL ASAP    Is it okay if the provider responds through MyChart: NO

## 2024-02-07 ENCOUNTER — HOSPITAL ENCOUNTER (OUTPATIENT)
Dept: CARDIOLOGY | Facility: HOSPITAL | Age: 64
Discharge: HOME | End: 2024-02-07
Admitting: INTERNAL MEDICINE
Payer: COMMERCIAL

## 2024-02-07 DIAGNOSIS — H93.11 TINNITUS OF RIGHT EAR: ICD-10-CM

## 2024-02-07 DIAGNOSIS — R29.898 WEAKNESS OF BOTH LOWER EXTREMITIES: ICD-10-CM

## 2024-02-07 DIAGNOSIS — R42 DIZZINESS: ICD-10-CM

## 2024-02-07 DIAGNOSIS — H55.00 NYSTAGMUS: ICD-10-CM

## 2024-02-07 DIAGNOSIS — R26.89 IMBALANCE: ICD-10-CM

## 2024-02-07 PROCEDURE — 93880 EXTRACRANIAL BILAT STUDY: CPT

## 2024-02-11 LAB
BH CV XLRA MEAS LEFT DIST CCA EDV: 24.1 CM/SEC
BH CV XLRA MEAS LEFT DIST CCA PSV: 65.3 CM/SEC
BH CV XLRA MEAS LEFT DIST ICA EDV: -43.9 CM/SEC
BH CV XLRA MEAS LEFT DIST ICA PSV: -103 CM/SEC
BH CV XLRA MEAS LEFT ICA/CCA RATIO: 1.6
BH CV XLRA MEAS LEFT MID ICA EDV: -36.4 CM/SEC
BH CV XLRA MEAS LEFT MID ICA PSV: -100 CM/SEC
BH CV XLRA MEAS LEFT PROX CCA EDV: 16.5 CM/SEC
BH CV XLRA MEAS LEFT PROX CCA PSV: 61.5 CM/SEC
BH CV XLRA MEAS LEFT PROX ECA PSV: -102 CM/SEC
BH CV XLRA MEAS LEFT PROX ICA EDV: -21.7 CM/SEC
BH CV XLRA MEAS LEFT PROX ICA PSV: -75.8 CM/SEC
BH CV XLRA MEAS LEFT VERTEBRAL A EDV: 22 CM/SEC
BH CV XLRA MEAS LEFT VERTEBRAL A PSV: 65.1 CM/SEC
BH CV XLRA MEAS RIGHT DIST CCA EDV: 28.2 CM/SEC
BH CV XLRA MEAS RIGHT DIST CCA PSV: 90.2 CM/SEC
BH CV XLRA MEAS RIGHT DIST ICA EDV: -21 CM/SEC
BH CV XLRA MEAS RIGHT DIST ICA PSV: -69.9 CM/SEC
BH CV XLRA MEAS RIGHT ICA/CCA RATIO: 1.3
BH CV XLRA MEAS RIGHT MID ICA EDV: -46.3 CM/SEC
BH CV XLRA MEAS RIGHT MID ICA PSV: -121 CM/SEC
BH CV XLRA MEAS RIGHT PROX CCA EDV: -24.3 CM/SEC
BH CV XLRA MEAS RIGHT PROX CCA PSV: -105 CM/SEC
BH CV XLRA MEAS RIGHT PROX ECA PSV: -126 CM/SEC
BH CV XLRA MEAS RIGHT PROX ICA EDV: -26.1 CM/SEC
BH CV XLRA MEAS RIGHT PROX ICA PSV: -79.5 CM/SEC
BH CV XLRA MEAS RIGHT VERTEBRAL A EDV: 14.7 CM/SEC
BH CV XLRA MEAS RIGHT VERTEBRAL A PSV: 44.8 CM/SEC

## 2024-02-16 ENCOUNTER — TELEPHONE (OUTPATIENT)
Dept: CARDIOLOGY | Facility: CLINIC | Age: 64
End: 2024-02-16
Payer: COMMERCIAL

## 2024-02-26 ENCOUNTER — TELEPHONE (OUTPATIENT)
Dept: CARDIOLOGY | Facility: CLINIC | Age: 64
End: 2024-02-26
Payer: COMMERCIAL

## 2024-02-26 NOTE — TELEPHONE ENCOUNTER
MONITOR RESULTS BRIEFLY DISCUSSED AND AWARE TO KEEP F/U APPT.'S. AMINA,PHOEBEN          ----- Message from Jeremiah Granado MD sent at 2/26/2024  1:07 PM EST -----  Keep routine follow-up as scheduled.  ----- Message -----  From: Jeremiah Granado MD  Sent: 2/20/2024   6:47 PM EST  To: Jeremiah Granado MD

## 2024-03-14 ENCOUNTER — TELEPHONE (OUTPATIENT)
Dept: PHARMACY | Facility: HOSPITAL | Age: 64
End: 2024-03-14
Payer: COMMERCIAL

## 2024-03-14 NOTE — TELEPHONE ENCOUNTER
I spoke with patient this morning because she cancelled her initial Leqvio injection appointment. She has decided not to do the injection. She is scared to try it and her family doctor put her on a new cholesterol tablet that she is going to try first. I did inform her that if she decided to try the injection she would need a new referral.

## 2025-01-24 ENCOUNTER — OFFICE VISIT (OUTPATIENT)
Dept: CARDIOLOGY | Facility: CLINIC | Age: 65
End: 2025-01-24
Payer: COMMERCIAL

## 2025-01-24 VITALS
SYSTOLIC BLOOD PRESSURE: 146 MMHG | DIASTOLIC BLOOD PRESSURE: 81 MMHG | WEIGHT: 180.4 LBS | OXYGEN SATURATION: 98 % | HEART RATE: 80 BPM | HEIGHT: 61 IN | BODY MASS INDEX: 34.06 KG/M2

## 2025-01-24 DIAGNOSIS — R00.2 PALPITATIONS: Primary | ICD-10-CM

## 2025-01-24 DIAGNOSIS — E78.2 MIXED HYPERLIPIDEMIA: ICD-10-CM

## 2025-01-24 DIAGNOSIS — I10 ESSENTIAL HYPERTENSION: ICD-10-CM

## 2025-01-24 PROCEDURE — 3079F DIAST BP 80-89 MM HG: CPT | Performed by: INTERNAL MEDICINE

## 2025-01-24 PROCEDURE — 93000 ELECTROCARDIOGRAM COMPLETE: CPT | Performed by: INTERNAL MEDICINE

## 2025-01-24 PROCEDURE — 99214 OFFICE O/P EST MOD 30 MIN: CPT | Performed by: INTERNAL MEDICINE

## 2025-01-24 PROCEDURE — 3077F SYST BP >= 140 MM HG: CPT | Performed by: INTERNAL MEDICINE

## 2025-01-24 RX ORDER — UBIDECARENONE 100 MG
1 CAPSULE ORAL DAILY
COMMUNITY
Start: 2025-01-09

## 2025-01-24 RX ORDER — EZETIMIBE 10 MG/1
1 TABLET ORAL DAILY
COMMUNITY
Start: 2025-01-09

## 2025-01-24 NOTE — PROGRESS NOTES
Subjective   Xochitl Albert is a 64 y.o. female     No chief complaint on file.      PROBLEM LIST:     1.  Hypertension  1.1 echocardiogram 2/21: EF equal or greater than 50%, excluded hemodynamically severe regurgitation or stenosis  2. Hyperlipidemia  3. Palpitations    Specialty Problems          Cardiology Problems    Hyperlipidemia        Palpitations        Heart valve regurgitation             HPI:  Ms. Albert returns for follow-up on the above.    She continues to have episodes of palpitations.  Prior event monitors demonstrated no significant dysrhythmic substrate.  She states that she had a monitor placed by Dr. Vega and again had no dysrhythmia.  Palpitations are described as a rapid fluttering the chest which do not cause chest pain, dizziness, or shortness of breath.  She has mild orthostatic lightheadedness.    Despite extensive counseling the patient did not pursue lipid-lowering therapy.  She was approved for Leqvio.  We have no recent lipid profile for review.  Lipids from last year demonstrated an LDL of greater than 200.  She continues to describe labile systemic hypertension.                    PRIOR MEDICATIONS    Current Outpatient Medications on File Prior to Visit   Medication Sig Dispense Refill    cetirizine (zyrTEC) 10 MG tablet Take 1 tablet by mouth Daily As Needed.      D3-1000 25 MCG (1000 UT) capsule Take 1 capsule by mouth Daily.      estradiol (ESTRACE) 0.1 MG/GM vaginal cream Uses 2-3 x a week      ezetimibe (ZETIA) 10 MG tablet Take 1 tablet by mouth Daily.      lansoprazole (PREVACID) 30 MG capsule Take  by mouth Daily.      meclizine (ANTIVERT) 25 MG tablet Take 0.5 tablets by mouth 3 (Three) Times a Day As Needed.      montelukast (SINGULAIR) 10 MG tablet Take 1 tablet by mouth Daily.      [DISCONTINUED] Cholecalciferol (Vitamin D) 50 MCG (2000 UT) tablet Take 1 tablet by mouth. Occas.       No current facility-administered medications on file prior to visit.        ALLERGIES:    Iodine, Amoxicillin-pot clavulanate, Bactrim [sulfamethoxazole-trimethoprim], Crestor [rosuvastatin], Lipitor [atorvastatin], Lisinopril, Omnicef [cefdinir], and Sulfa antibiotics    PAST MEDICAL HISTORY:    Past Medical History:   Diagnosis Date    Arrhythmia     Edema     Fatigue     Hiatal hernia     History of breast abscess 05/2022    right breast    History of COVID-19 11/2020    History of recurrent UTI (urinary tract infection)     History of shingles 2020    Hyperlipidemia     Hypertension     Leaky heart valve     Palpitations        SURGICAL HISTORY:    History reviewed. No pertinent surgical history.    SOCIAL HISTORY:    Social History     Socioeconomic History    Marital status:    Tobacco Use    Smoking status: Never   Substance and Sexual Activity    Alcohol use: No    Drug use: No    Sexual activity: Defer       FAMILY HISTORY:    Family History   Problem Relation Age of Onset    Cancer Father     Hypertension Father     Hyperlipidemia Father     Hypertension Mother        Review of Systems   Constitutional: Negative.    HENT: Negative.     Eyes:  Positive for visual disturbance (reading glasses).   Respiratory: Negative.     Cardiovascular:  Positive for palpitations. Negative for chest pain and leg swelling.   Gastrointestinal: Negative.    Endocrine: Negative.    Genitourinary: Negative.    Musculoskeletal:  Positive for arthralgias and myalgias.   Skin: Negative.    Allergic/Immunologic: Positive for environmental allergies.   Neurological:  Positive for light-headedness. Negative for syncope.        Imbalance   Hematological:  Bruises/bleeds easily.   Psychiatric/Behavioral: Negative.         VISIT VITALS:  There were no vitals filed for this visit.   There were no vitals taken for this visit.    RECENT LABS:    Objective       Physical Exam  Vitals and nursing note reviewed.   Constitutional:       General: She is not in acute distress.     Appearance: She is  well-developed.   HENT:      Head: Normocephalic and atraumatic.   Eyes:      Conjunctiva/sclera: Conjunctivae normal.      Pupils: Pupils are equal, round, and reactive to light.   Neck:      Vascular: No carotid bruit, hepatojugular reflux or JVD.      Trachea: No tracheal deviation.      Comments: Nl. Carotid upstrokes  Cardiovascular:      Rate and Rhythm: Normal rate and regular rhythm.      Pulses:           Radial pulses are 2+ on the right side and 2+ on the left side.      Heart sounds: S1 normal and S2 normal. Murmur heard.      No friction rub. S4 sounds present.      Comments: 1/6 TR  NO MR  NO AI  Pulmonary:      Effort: Pulmonary effort is normal.      Breath sounds: Examination of the right-lower field reveals rhonchi. Rhonchi (scant, cleared with cough) present. No wheezing or rales.      Comments: Nl. Expir. Phase  Nl. Breath sound intensity  Abdominal:      General: Bowel sounds are normal. There is no distension or abdominal bruit.      Palpations: Abdomen is soft. There is no mass.      Tenderness: There is no abdominal tenderness. There is no guarding or rebound.      Comments: No organomegaly   Musculoskeletal:         General: No tenderness or deformity. Normal range of motion.      Cervical back: Normal range of motion and neck supple.      Right lower leg: No edema.      Left lower leg: No edema.      Comments: BLE, no edema, palpable PT pedal pulses     Skin:     General: Skin is warm and dry.      Coloration: Skin is not pale.      Findings: No erythema or rash.   Neurological:      Mental Status: She is alert and oriented to person, place, and time.   Psychiatric:         Behavior: Behavior normal.         Thought Content: Thought content normal.         Judgment: Judgment normal.           ECG 12 Lead    Date/Time: 1/24/2025 11:01 AM  Performed by: Jeremiah Granado MD    Authorized by: Jeremiah Granado MD  Comparison: compared with previous ECG from 3/1/2023  Comments: Sinus at 77.   Within normal limits.  Septal infarct described previously is now resolved.  This is likely due to lead placement on the previous EKG.            Assessment & Plan   #1.  Palpitations.  We discussed empiric therapy with beta-blocker.  The patient does not want to pursue any treatment for at this time.    2.  Labile systemic hypertension.  The patient insists her blood pressures are well-controlled the majority of time.  We will continue to monitor and defer need for further treatment to Dr. Vega office.    3.  Severe dyslipidemia with markedly elevated LDL.  We again discussed lipid-lowering therapy in detail.  We reviewed the mechanism of action of statins, PCSK9 inhibitors, and small interfering messenger RNA therapy.  The patient still does not wish to pursue lipid-lowering therapy despite a detailed discussion regarding the potential risks of untreated severe dyslipidemia.    4.  Ms. Albert will follow with Dr. Vega as instructed we will plan seeing her in follow-up in 1 year or on appearing basis as discussed.   Diagnosis Plan   1. Palpitations        2. Mixed hyperlipidemia        3. Essential hypertension            No follow-ups on file.         Xochitl Albert  reports that she has never smoked. She does not have any smokeless tobacco history on file. I have educated her on the risk of diseases from using tobacco products such as cancer, COPD, and heart disease.               Class 2 Severe Obesity (BMI >=35 and <=39.9). Obesity-related health conditions include the following: hypertension, dyslipidemias, and GERD. Obesity is  to be assessed at today's visit . BMI is  pcp addressing . We discussed portion control and increasing exercise.               Electronically signed by:    Scribed for Jeremiah Granado MD by Domenica Xiong LPN on January 24, 2025  at 11:00 EST    I, Jeremiah Granado MD personally performed the services described in this documentation as scribed by the above named individual in my  presence, and it is both accurate and complete. January 24, 2025 11:00 EST      Dictated Utilizing Dragon Dictation: Part of this note may be an electronic transcription/translation of spoken language to printed text using the Dragon Dictation System.

## 2025-05-27 ENCOUNTER — TELEPHONE (OUTPATIENT)
Dept: CARDIOLOGY | Facility: CLINIC | Age: 65
End: 2025-05-27

## 2025-05-27 NOTE — TELEPHONE ENCOUNTER
Please assist patient with scheduling a sooner appointment and get heart monitor results from her PCP for Dr. Granado to review.

## 2025-05-27 NOTE — TELEPHONE ENCOUNTER
Caller: Xochitl Albert    Relationship to patient: Self    Best call back number: [phone]     Chief complaint: STATES FAMILY DR FOUND SOMETHING WRONG AT THE TOP OF HER HEART FROM A HEART MONITOR THAT SHE WORE, WAS TOLD SHE NEEDS TO BE SEEN SOONER.     Type of visit: F/U     Requested date: ASAP APPT EARLY IN DAY      If rescheduling, when is the original appointment: 1/26/26

## 2025-06-19 ENCOUNTER — SPECIALTY PHARMACY (OUTPATIENT)
Dept: PHARMACY | Facility: HOSPITAL | Age: 65
End: 2025-06-19
Payer: COMMERCIAL

## 2025-06-19 ENCOUNTER — OFFICE VISIT (OUTPATIENT)
Dept: CARDIOLOGY | Facility: CLINIC | Age: 65
End: 2025-06-19
Payer: COMMERCIAL

## 2025-06-19 VITALS
HEART RATE: 94 BPM | BODY MASS INDEX: 33.42 KG/M2 | DIASTOLIC BLOOD PRESSURE: 89 MMHG | SYSTOLIC BLOOD PRESSURE: 159 MMHG | WEIGHT: 177 LBS | OXYGEN SATURATION: 98 % | HEIGHT: 61 IN

## 2025-06-19 DIAGNOSIS — I38 HEART VALVE REGURGITATION: ICD-10-CM

## 2025-06-19 DIAGNOSIS — I10 ESSENTIAL HYPERTENSION: ICD-10-CM

## 2025-06-19 DIAGNOSIS — R00.2 PALPITATIONS: Primary | ICD-10-CM

## 2025-06-19 DIAGNOSIS — E78.2 MIXED HYPERLIPIDEMIA: ICD-10-CM

## 2025-06-19 PROCEDURE — 3079F DIAST BP 80-89 MM HG: CPT | Performed by: INTERNAL MEDICINE

## 2025-06-19 PROCEDURE — 3077F SYST BP >= 140 MM HG: CPT | Performed by: INTERNAL MEDICINE

## 2025-06-19 PROCEDURE — 99214 OFFICE O/P EST MOD 30 MIN: CPT | Performed by: INTERNAL MEDICINE

## 2025-06-19 NOTE — PROGRESS NOTES
[facility] Patient Management Program  Refill Outreach     Assess for PCSK9 or inclisiran  In July insurance changes to [employer] and [employer]  Office is requesting fasting labs just done through pcp                    Rosana Mancia, Pharmacy Technician  6/19/2025  16:10 EDT

## 2025-06-19 NOTE — PROGRESS NOTES
Subjective   Xochitl Albert is a 64 y.o. female     Chief Complaint   Patient presents with    Follow-up     Here for monitor results.     Palpitations       PROBLEM LIST:     1.  Hypertension  1.1 echocardiogram 2/21: EF equal or greater than 50%, excluded hemodynamically severe regurgitation or stenosis  2. Hyperlipidemia  3. Palpitations    Specialty Problems          Cardiology Problems    Hyperlipidemia        Palpitations        Heart valve regurgitation        Essential hypertension             HPI:    Ms. Albert returns for follow-up.    Because of worsening palpitation she had a 48-hour event monitor placed through Dr. Vega office.  That study demonstrated PACs, rare PVCs, and ectopic atrial rhythm but no significant sustained ectopy either atrial or ventricular in nature, and no pauses.  There is a description of terminal P wave abnormality suggestive of left atrial disease and baseline EKG is unremarkable except for persistent juvenile T wave changes.  The patient describes palpitations that wax and wane but she has been completely free of this for the last several weeks.  New    The patient describes stable class I-II functional capacity and has no chest pain.  She describes no symptoms of heart failure, peripheral arterial disease, or arterial embolic events.                    PRIOR MEDICATIONS    Current Outpatient Medications on File Prior to Visit   Medication Sig Dispense Refill    cetirizine (zyrTEC) 10 MG tablet Take 1 tablet by mouth Daily As Needed.      Cholecalciferol (D3-1000 PO) Take 3,000 Units by mouth Daily.      estradiol (ESTRACE) 0.1 MG/GM vaginal cream Uses 2-3 x a week      lansoprazole (PREVACID) 30 MG capsule Take  by mouth Daily.      meclizine (ANTIVERT) 25 MG tablet Take 0.5 tablets by mouth 3 (Three) Times a Day As Needed.      montelukast (SINGULAIR) 10 MG tablet Take 1 tablet by mouth Daily.      ezetimibe (ZETIA) 10 MG tablet Take 1 tablet by mouth Daily. (Patient not  taking: Reported on 6/19/2025)       No current facility-administered medications on file prior to visit.       ALLERGIES:    Iodine, Amoxicillin-pot clavulanate, Bactrim [sulfamethoxazole-trimethoprim], Crestor [rosuvastatin], Lipitor [atorvastatin], Lisinopril, Omnicef [cefdinir], and Sulfa antibiotics    PAST MEDICAL HISTORY:    Past Medical History:   Diagnosis Date    Arrhythmia     Edema     Fatigue     Hiatal hernia     History of breast abscess 05/2022    right breast    History of COVID-19 11/2020    History of recurrent UTI (urinary tract infection)     History of shingles 2020    Hyperlipidemia     Hypertension     Leaky heart valve     Palpitations        SURGICAL HISTORY:    History reviewed. No pertinent surgical history.    SOCIAL HISTORY:    Social History     Socioeconomic History    Marital status:    Tobacco Use    Smoking status: Never    Smokeless tobacco: Never   Vaping Use    Vaping status: Never Used   Substance and Sexual Activity    Alcohol use: No    Drug use: No    Sexual activity: Defer       FAMILY HISTORY:    Family History   Problem Relation Age of Onset    Cancer Father     Hypertension Father     Hyperlipidemia Father     Hypertension Mother        Review of Systems   Constitutional: Negative.    HENT: Negative.     Eyes:  Positive for visual disturbance (reading glasses).   Respiratory: Negative.     Cardiovascular:  Positive for leg swelling (ankles). Negative for chest pain and palpitations.   Gastrointestinal: Negative.    Endocrine: Negative.    Genitourinary: Negative.    Musculoskeletal:  Positive for arthralgias and myalgias.   Skin: Negative.    Allergic/Immunologic: Positive for environmental allergies.   Neurological:  Positive for dizziness and light-headedness. Negative for syncope.   Hematological:  Bruises/bleeds easily.   Psychiatric/Behavioral:  Positive for sleep disturbance (off and on).        VISIT VITALS:  Vitals:    06/19/25 1404   BP: 159/89   BP  "Location: Left arm   Patient Position: Sitting   Pulse: 94   SpO2: 98%   Weight: 80.3 kg (177 lb)   Height: 154.9 cm (60.98\")      /89 (BP Location: Left arm, Patient Position: Sitting)   Pulse 94   Ht 154.9 cm (60.98\")   Wt 80.3 kg (177 lb)   SpO2 98%   BMI 33.46 kg/m²     RECENT LABS:    Objective       Physical Exam    Procedures      Assessment & Plan   #1.  Palpitations.  We discussed the nature of the patient's palpitations.  She does not wish to start symptomatic therapy at this time.  We discussed that we can continue to monitor symptoms and if they worsen start medicine at any time.    2.  Dyslipidemia.  The patient's LDL cholesterol is still severely elevated.  I had a detailed discussion again with the patient regarding risks and benefits of medication and the fact that that PCSK9 inhibitor and or inclisiran are not likely to cause muscle or joint pain because of different mechanisms of action in comparison to statin.  The patient again agreed that she would trial medications but we have been at this juncture in the past and she has never proceeded with initiating therapy.  I tried to impress on her the long-term importance of controlling lipids.    3.  Systemic hypertension.  Blood pressures are again elevated today.  We will defer management to Dr. Vega.    4.  The patient will follow with Dr. Vega as instructed and we will plan on seeing her in follow-up in 6 months or on an as needed basis for medication intolerance or symptoms as discussed in detail at the time of today's visit.    5.  Note: There is 35 minutes of face-to-face time with this patient on today's visit obtaining a history, discussing the nature of her dysrhythmia and symptoms of palpitations, and the need for control of her severe dyslipidemia to prevent long-term cardiovascular adverse effects.   Diagnosis Plan   1. Palpitations        2. Mixed hyperlipidemia        3. Heart valve regurgitation        4. Essential " hypertension            No follow-ups on file.         Xochitl Albert  reports that she has never smoked. She has never used smokeless tobacco. I have educated her on the risk of diseases from using tobacco products such as cancer, COPD, and heart disease.                 DO YOU VAPE? NO                    Electronically signed by:    Scribed for Jeremiah Granado MD by Domenica Xiong LPN on June 19, 2025  at 14:12 EDT    I, Jeremiah Granado MD personally performed the services described in this documentation as scribed by the above named individual in my presence, and it is both accurate and complete. June 19, 2025 14:12 EDT      Dictated Utilizing Dragon Dictation: Part of this note may be an electronic transcription/translation of spoken language to printed text using the Dragon Dictation System.